# Patient Record
Sex: FEMALE | Race: WHITE | NOT HISPANIC OR LATINO | Employment: OTHER
[De-identification: names, ages, dates, MRNs, and addresses within clinical notes are randomized per-mention and may not be internally consistent; named-entity substitution may affect disease eponyms.]

---

## 2021-01-15 DIAGNOSIS — Z23 NEED FOR VACCINATION: ICD-10-CM

## 2022-02-08 ENCOUNTER — OFFICE VISIT (OUTPATIENT)
Dept: MEDICAL GROUP | Facility: CLINIC | Age: 72
End: 2022-02-08
Payer: MEDICARE

## 2022-02-08 VITALS
HEART RATE: 88 BPM | HEIGHT: 64 IN | SYSTOLIC BLOOD PRESSURE: 132 MMHG | RESPIRATION RATE: 17 BRPM | DIASTOLIC BLOOD PRESSURE: 80 MMHG | OXYGEN SATURATION: 96 % | WEIGHT: 117 LBS | BODY MASS INDEX: 19.97 KG/M2

## 2022-02-08 DIAGNOSIS — Z79.891 LONG TERM (CURRENT) USE OF OPIATE ANALGESIC: ICD-10-CM

## 2022-02-08 DIAGNOSIS — E11.42 TYPE 2 DIABETES MELLITUS WITH DIABETIC POLYNEUROPATHY, WITHOUT LONG-TERM CURRENT USE OF INSULIN (HCC): ICD-10-CM

## 2022-02-08 DIAGNOSIS — R53.83 FATIGUE, UNSPECIFIED TYPE: ICD-10-CM

## 2022-02-08 DIAGNOSIS — F32.9 MAJOR DEPRESSIVE DISORDER, REMISSION STATUS UNSPECIFIED, UNSPECIFIED WHETHER RECURRENT: ICD-10-CM

## 2022-02-08 DIAGNOSIS — F33.2 ENDOGENOUS DEPRESSION (HCC): ICD-10-CM

## 2022-02-08 DIAGNOSIS — E78.2 MIXED HYPERLIPIDEMIA: ICD-10-CM

## 2022-02-08 DIAGNOSIS — R00.2 PALPITATIONS: ICD-10-CM

## 2022-02-08 DIAGNOSIS — E11.42 DIABETIC POLYNEUROPATHY ASSOCIATED WITH TYPE 2 DIABETES MELLITUS (HCC): ICD-10-CM

## 2022-02-08 DIAGNOSIS — E03.9 ACQUIRED HYPOTHYROIDISM: ICD-10-CM

## 2022-02-08 DIAGNOSIS — Z13.1 SCREENING FOR DIABETES MELLITUS: ICD-10-CM

## 2022-02-08 DIAGNOSIS — I10 ESSENTIAL HYPERTENSION, BENIGN: ICD-10-CM

## 2022-02-08 DIAGNOSIS — Z86.718 HISTORY OF DVT (DEEP VEIN THROMBOSIS): ICD-10-CM

## 2022-02-08 DIAGNOSIS — E78.5 DYSLIPIDEMIA: ICD-10-CM

## 2022-02-08 DIAGNOSIS — R07.89 OTHER CHEST PAIN: ICD-10-CM

## 2022-02-08 DIAGNOSIS — F13.20 ANXIOLYTIC DEPENDENCE WITH CURRENT USE (HCC): ICD-10-CM

## 2022-02-08 PROBLEM — F06.4 ANXIETY DISORDER DUE TO MEDICAL CONDITION: Status: ACTIVE | Noted: 2021-10-18

## 2022-02-08 PROBLEM — R94.4 ABNORMAL RENAL FUNCTION TEST: Status: ACTIVE | Noted: 2021-10-18

## 2022-02-08 PROBLEM — E11.69 COMBINED HYPERLIPIDEMIA ASSOCIATED WITH TYPE 2 DIABETES MELLITUS (HCC): Status: ACTIVE | Noted: 2021-10-18

## 2022-02-08 PROBLEM — E11.49 TYPE 2 DIABETES MELLITUS WITH NEUROLOGIC COMPLICATION (HCC): Status: ACTIVE | Noted: 2022-02-08

## 2022-02-08 PROBLEM — M19.079 PRIMARY LOCALIZED OSTEOARTHROSIS OF ANKLE AND FOOT: Status: ACTIVE | Noted: 2019-12-18

## 2022-02-08 PROBLEM — K21.9 GASTROESOPHAGEAL REFLUX DISEASE: Status: ACTIVE | Noted: 2021-10-18

## 2022-02-08 PROBLEM — G89.29 OTHER CHRONIC PAIN: Status: ACTIVE | Noted: 2021-10-18

## 2022-02-08 PROCEDURE — 99205 OFFICE O/P NEW HI 60 MIN: CPT | Performed by: FAMILY MEDICINE

## 2022-02-08 PROCEDURE — 93000 ELECTROCARDIOGRAM COMPLETE: CPT | Performed by: FAMILY MEDICINE

## 2022-02-08 RX ORDER — LEVOTHYROXINE SODIUM 0.05 MG/1
TABLET ORAL
COMMUNITY
Start: 2021-10-18 | End: 2022-02-24 | Stop reason: SDUPTHER

## 2022-02-08 RX ORDER — LEVALBUTEROL TARTRATE 45 UG/1
AEROSOL, METERED ORAL
COMMUNITY

## 2022-02-08 RX ORDER — PRAVASTATIN SODIUM 10 MG
10 TABLET ORAL
COMMUNITY
Start: 2022-01-10 | End: 2022-08-27 | Stop reason: SDUPTHER

## 2022-02-08 RX ORDER — CLONIDINE HYDROCHLORIDE 0.1 MG/1
1 TABLET ORAL DAILY
COMMUNITY
End: 2022-07-27 | Stop reason: SDUPTHER

## 2022-02-08 RX ORDER — POTASSIUM CHLORIDE 750 MG/1
1 TABLET, EXTENDED RELEASE ORAL DAILY
COMMUNITY
End: 2023-03-29

## 2022-02-08 RX ORDER — POTASSIUM CHLORIDE 750 MG/1
TABLET, FILM COATED, EXTENDED RELEASE ORAL
COMMUNITY
End: 2023-03-29

## 2022-02-08 RX ORDER — OXYCODONE AND ACETAMINOPHEN 10; 325 MG/1; MG/1
10-325 TABLET ORAL
COMMUNITY
Start: 2022-02-02

## 2022-02-08 RX ORDER — IBUPROFEN 800 MG/1
800 TABLET ORAL EVERY 8 HOURS PRN
COMMUNITY
Start: 2021-11-30

## 2022-02-08 RX ORDER — PANTOPRAZOLE SODIUM 40 MG/1
1 TABLET, DELAYED RELEASE ORAL DAILY
COMMUNITY
End: 2024-01-16

## 2022-02-08 RX ORDER — LOSARTAN POTASSIUM 100 MG/1
TABLET ORAL
COMMUNITY
Start: 2022-01-11 | End: 2022-02-24 | Stop reason: SDUPTHER

## 2022-02-08 RX ORDER — MONTELUKAST SODIUM 10 MG/1
TABLET ORAL
COMMUNITY
Start: 2022-01-11 | End: 2022-02-24 | Stop reason: SDUPTHER

## 2022-02-08 RX ORDER — CYCLOBENZAPRINE HCL 10 MG
10 TABLET ORAL 2 TIMES DAILY PRN
COMMUNITY
Start: 2022-01-10

## 2022-02-08 RX ORDER — CLONAZEPAM 0.5 MG/1
1 TABLET ORAL 3 TIMES DAILY PRN
COMMUNITY
Start: 2021-11-30 | End: 2022-07-27 | Stop reason: SDUPTHER

## 2022-02-08 RX ORDER — BETAMETHASONE DIPROPIONATE 0.5 MG/G
1 CREAM TOPICAL
COMMUNITY
Start: 2021-10-18 | End: 2023-03-29

## 2022-02-08 RX ORDER — HYDROCHLOROTHIAZIDE 25 MG/1
25 TABLET ORAL DAILY
COMMUNITY
Start: 2021-10-18 | End: 2022-08-27 | Stop reason: SDUPTHER

## 2022-02-08 ASSESSMENT — PATIENT HEALTH QUESTIONNAIRE - PHQ9: CLINICAL INTERPRETATION OF PHQ2 SCORE: 0

## 2022-02-08 NOTE — ASSESSMENT & PLAN NOTE
Is seeing pain management at [facility]. Long term pain to ankle and due to multiple sites of osteoarthritis. Still having a lot of pain despite 62.5MME/d oxy. OD risk score of 300 with narc score 500. She has been on this regimen for many years and is feels her pain doctor is not listening to her. She is also on clonazepam, which her pain med provider is not happy with although she has been on this combination for 10+ years.

## 2022-02-08 NOTE — ASSESSMENT & PLAN NOTE
Pt reports she is diabetic. Previously on insulin but was able to come off. Not currently on medication. No recent A1C available. We will get labs. She is on a statin, and an arb. F/u when labs are done.

## 2022-02-08 NOTE — ASSESSMENT & PLAN NOTE
Had provoked DVT with four vessel involvement on R leg. Just on aspirin daily now. Was on anticoagulation with lovenox, heparin--did not tolerate well. Currently with periodic pain to top of thigh.

## 2022-02-08 NOTE — PROGRESS NOTES
CC:  Diagnoses of Dyslipidemia, Fatigue, unspecified type, Screening for diabetes mellitus, Major depressive disorder, remission status unspecified, unspecified whether recurrent, Palpitations, History of DVT (deep vein thrombosis), Other chest pain, Acquired hypothyroidism, Anxiolytic dependence with current use (HCC), Diabetic polyneuropathy associated with type 2 diabetes mellitus (HCC), Endogenous depression (HCC), Essential hypertension, benign, Long term (current) use of opiate analgesic, and Mixed hyperlipidemia were pertinent to this visit.    HISTORY OF THE PRESENT ILLNESS: Patient is a 71 y.o. female. This pleasant patient is here today to establish care. She is accompanied by her , also here to establish care, who has dementia.    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 Has retired from being a pharmacist. She reports daily uncontrolled pain from osteoarthritis but continues to work around the ranch. She is tearful during the conversation and expresses her feelings of despair. Two of her 4 kids do help her from time to time around the ranch and she has wonderful neighbors who also help, but the daily tasks are becoming too much for her to handle.    She is being treated for her long term use of opiates for pain by [facility]. There is some disagreement with the doctor about her regimen as she is on both opiates and benzodiazepine for many years thus likely has high physiologic tolerance.     Her goal is to optimize her health so that she can continue to care for the ranch and for her  and stay on the ranch as long as they possibly can.        Health Maintenance: deferred till next visit; UTD on flu shot and Covid series.       History of DVT (deep vein thrombosis)  Had provoked DVT with four vessel involvement on R leg. Just on aspirin daily now. Was on anticoagulation with lovenox, heparin--did not tolerate  well. Currently with periodic pain to top of thigh.     Acquired hypothyroidism  Check TSH    Anxiolytic dependence with current use (HCC)  On clonazepam and followed by pain management    Diabetic polyneuropathy associated with type 2 diabetes mellitus (HCC)  On low dose statin at tolerated level    Endogenous depression (HCC)  See HPI--many life stressors including keeping up a ranch, elderly  with dementia, chronic pain    Essential hypertension, benign  Controlled in office today on current medication regimen    Long term (current) use of opiate analgesic  Is seeing pain management at [facility]. Long term pain to ankle and due to multiple sites of osteoarthritis. Still having a lot of pain despite 62.5MME/d oxy. OD risk score of 300 with narc score 500. She has been on this regimen for many years and is feels her pain doctor is not listening to her. She is also on clonazepam, which her pain med provider is not happy with although she has been on this combination for 10+ years.     Mixed hyperlipidemia  She is tolerating current statin dose.     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 Previously on insulin but was able to come off. Not currently on medication. No recent A1C available. We will get labs. She is on a statin, and an arb. F/u when labs are done.     Other chest pain  Pt reports occasional left sided chest pain not related to exertion, believes that it might be more related to reflux and has a history of esophageal spasm. We discussed that a stress test would be a good idea however she is very concerned about the nuc med test as she doesn't think she will tolerate the stress medication, and she cannot do a treadmill test due to her arthritis. We decided to order a CAC score CT scan to assess for calcification of the coronary arteries; if the number is >100, we will revisit the stress test idea and refer to cardiology. She is already on a  statin and aspirin.  Noted to have LBBB on EKG in office today, not known to have this in the past.     Allergies: Ondansetron, Penicillins, Tramadol, Lyrica, and Pregabalin    Current Outpatient Medications Ordered in Epic   Medication Sig Dispense Refill   • Aug Betamethasone Dipropionate (DIPROLENE-AF) 0.05 % Cream Apply 1 Application topically.     • clonazePAM (KLONOPIN) 0.5 MG Tab      • cyclobenzaprine (FLEXERIL) 10 mg Tab Take 10 mg by mouth 2 times a day as needed.     • ibuprofen (MOTRIN) 800 MG Tab Take 800 mg by mouth every 8 hours as needed.     • levalbuterol (XOPENEX HFA) 45 MCG/ACT inhaler levalbuterol HFA 45 mcg/actuation aerosol inhaler     • levothyroxine (SYNTHROID) 50 MCG Tab levothyroxine 50 mcg tablet   TK 1 T PO QD     • montelukast (SINGULAIR) 10 MG Tab      • losartan (COZAAR) 100 MG Tab      • oxyCODONE-acetaminophen (PERCOCET-10)  MG Tab Take  Tablets by mouth.     • potassium chloride ER (KLOR-CON 10) 10 MEQ tablet Klor-Con 10 mEq tablet,extended release   TAKE 2 TABLET BY ORAL ROUTE EVERY DAY WITH FOOD     • pravastatin (PRAVACHOL) 10 MG Tab Take 10 mg by mouth every day.     • hydroCHLOROthiazide (HYDRODIURIL) 25 MG Tab Take 25 mg by mouth every day.     • cloNIDine (CATAPRES) 0.1 MG Tab Take 1 Tablet by mouth every day.     • pantoprazole (PROTONIX) 40 MG Tablet Delayed Response Take 1 Tablet by mouth every day.     • potassium chloride SA (K-DUR) 10 MEQ Tab CR Take 1 Tablet by mouth every day.       No current [facility]-ordered facility-administered medications on file.       Past Medical History:   Diagnosis Date   • Diabetes (HCC)        History reviewed. No pertinent surgical history.    Social History     Tobacco Use   • Smoking status: Never Smoker   • Smokeless tobacco: Never Used   Vaping Use   • Vaping Use: Not on file   Substance Use Topics   • Alcohol use: Not Currently   • Drug use: Not on file       Social History     Social History Narrative   • Not on file  "      History reviewed. No pertinent family history.        Exam: /80 (BP Location: Left arm, Patient Position: Sitting, BP Cuff Size: Adult)   Pulse 88   Resp 17   Ht 1.626 m (5' 4\")   Wt 53.1 kg (117 lb)   SpO2 96%  Body mass index is 20.08 kg/m².      GENERAL: No acute distress  HENT: Atraumatic, normocephalic  EYES: Extraocular movements intact, pupils equal and reactive to light  NECK: Supple, FROM  CHEST: No deformities, Equal chest expansion  RESP: Unlabored, no stridor or audible wheeze  ABD: Non-Distended  Extremities: No Clubbing, Cyanosis, or Edema  Skin: Warm/dry, without rashes  Neuro: A/O x 4, CN 2-12 Grossly intact, Motor/sensory grossly intact  Psych: Normal behavior, normal affect    EKG Interpretation    Interpreted by emergency department physician    Rhythm: normal sinus   Rate: normal  Axis: normal  Ectopy: none  Conduction: left bundle branch block (complete)  ST Segments: normal  T Waves: normal  Q Waves: none    Clinical Impression: left bundle branch block          Lab review:  labs are reviewed, up to date and normal, orders written for new lab studies as appropriate; see orders    Medical Records Reviewed:  yes    Imaging Review:  None in epic    Assessment/Plan:  1. Dyslipidemia    - Comp Metabolic Panel; Future  - HEMOGLOBIN A1C; Future    2. Fatigue, unspecified type    - CBC WITH DIFFERENTIAL; Future  - TSH WITH REFLEX TO FT4; Future  - HEMOGLOBIN A1C; Future    3. Screening for diabetes mellitus    - HEMOGLOBIN A1C; Future    4. Major depressive disorder, remission status unspecified, unspecified whether recurrent    - TSH WITH REFLEX TO FT4; Future    5. Palpitations    - EKG - Clinic Performed    6. History of DVT (deep vein thrombosis)      7. Other chest pain    - CT-CARDIAC SCORING W-SYMPTOMS; Future    8. Acquired hypothyroidism      9. Anxiolytic dependence with current use (HCC)      10. Diabetic polyneuropathy associated with type 2 diabetes mellitus (HCC)      11. " Endogenous depression (HCC)      12. Essential hypertension, benign      13. Long term (current) use of opiate analgesic      14. Mixed hyperlipidemia        No follow-ups on file.    My total time spent caring for the patient on the day of the encounter was 50 minutes.   This does not include time spent on separately billable procedures/tests.    Please note that this dictation was created using voice recognition software. I have made every reasonable attempt to correct obvious errors, but I expect that there are errors of grammar and possibly content that I did not discover before finalizing the note.

## 2022-02-08 NOTE — ASSESSMENT & PLAN NOTE
Pt reports occasional left sided chest pain not related to exertion, believes that it might be more related to reflux and has a history of esophageal spasm. We discussed that a stress test would be a good idea however she is very concerned about the nuc med test as she doesn't think she will tolerate the stress medication, and she cannot do a treadmill test due to her arthritis. We decided to order a CAC score CT scan to assess for calcification of the coronary arteries; if the number is >100, we will revisit the stress test idea and refer to cardiology. She is already on a statin and aspirin.  Noted to have LBBB on EKG in office today, not known to have this in the past.

## 2022-02-24 ENCOUNTER — TELEPHONE (OUTPATIENT)
Dept: MEDICAL GROUP | Facility: CLINIC | Age: 72
End: 2022-02-24
Payer: MEDICARE

## 2022-02-24 RX ORDER — MONTELUKAST SODIUM 10 MG/1
10 TABLET ORAL DAILY
Qty: 100 TABLET | Refills: 3 | Status: SHIPPED | OUTPATIENT
Start: 2022-02-24 | End: 2023-05-19

## 2022-02-24 RX ORDER — LEVOTHYROXINE SODIUM 0.05 MG/1
TABLET ORAL
Qty: 100 TABLET | Refills: 3 | Status: SHIPPED | OUTPATIENT
Start: 2022-02-24 | End: 2023-05-19

## 2022-02-24 RX ORDER — LOSARTAN POTASSIUM 100 MG/1
50 TABLET ORAL DAILY
Qty: 100 TABLET | Refills: 3 | Status: SHIPPED | OUTPATIENT
Start: 2022-02-24 | End: 2022-06-02 | Stop reason: SDUPTHER

## 2022-02-24 NOTE — TELEPHONE ENCOUNTER
Received request via: Pharmacy    Was the patient seen in the last year in this department? Yes    Does the patient have an active prescription (recently filled or refills available) for medication(s) requested? No     Patient requesting lorazepam and Benazepril 0.5mg bid prn #[id number]..

## 2022-03-28 ENCOUNTER — APPOINTMENT (OUTPATIENT)
Dept: MEDICAL GROUP | Facility: CLINIC | Age: 72
End: 2022-03-28
Payer: MEDICARE

## 2022-06-02 NOTE — TELEPHONE ENCOUNTER
Patient requesting change on dosage of medication to a whole tablet, rather than the 0.5mg that is prescribed. I called the pharmacy and they would not let me change it unless they had something from you saying otherwise.   Thank you!

## 2022-06-04 RX ORDER — LOSARTAN POTASSIUM 100 MG/1
100 TABLET ORAL DAILY
Qty: 100 TABLET | Refills: 3 | Status: SHIPPED | OUTPATIENT
Start: 2022-06-04 | End: 2023-08-01

## 2022-07-27 DIAGNOSIS — F06.4 ANXIETY DISORDER DUE TO MEDICAL CONDITION: ICD-10-CM

## 2022-07-27 RX ORDER — CLONIDINE HYDROCHLORIDE 0.1 MG/1
0.1 TABLET ORAL DAILY
Qty: 100 TABLET | Refills: 3 | Status: SHIPPED | OUTPATIENT
Start: 2022-07-27 | End: 2022-08-23

## 2022-07-28 RX ORDER — CLONAZEPAM 0.5 MG/1
1 TABLET ORAL 3 TIMES DAILY PRN
Qty: 90 TABLET | Refills: 1 | Status: SHIPPED | OUTPATIENT
Start: 2022-07-28 | End: 2022-10-26

## 2022-08-23 ENCOUNTER — OFFICE VISIT (OUTPATIENT)
Dept: MEDICAL GROUP | Facility: CLINIC | Age: 72
End: 2022-08-23
Payer: MEDICARE

## 2022-08-23 ENCOUNTER — PATIENT MESSAGE (OUTPATIENT)
Dept: MEDICAL GROUP | Facility: CLINIC | Age: 72
End: 2022-08-23

## 2022-08-23 VITALS
BODY MASS INDEX: 29.88 KG/M2 | OXYGEN SATURATION: 94 % | HEIGHT: 64 IN | WEIGHT: 175 LBS | SYSTOLIC BLOOD PRESSURE: 179 MMHG | HEART RATE: 86 BPM | RESPIRATION RATE: 16 BRPM | DIASTOLIC BLOOD PRESSURE: 90 MMHG

## 2022-08-23 DIAGNOSIS — E11.42 TYPE 2 DIABETES MELLITUS WITH DIABETIC POLYNEUROPATHY, WITHOUT LONG-TERM CURRENT USE OF INSULIN (HCC): ICD-10-CM

## 2022-08-23 DIAGNOSIS — Z12.31 ENCOUNTER FOR SCREENING MAMMOGRAM FOR MALIGNANT NEOPLASM OF BREAST: ICD-10-CM

## 2022-08-23 DIAGNOSIS — Z63.6 CAREGIVER STRESS: ICD-10-CM

## 2022-08-23 DIAGNOSIS — R41.3 MEMORY PROBLEM: ICD-10-CM

## 2022-08-23 DIAGNOSIS — E66.09 CLASS 1 OBESITY DUE TO EXCESS CALORIES WITH SERIOUS COMORBIDITY AND BODY MASS INDEX (BMI) OF 30.0 TO 30.9 IN ADULT: ICD-10-CM

## 2022-08-23 DIAGNOSIS — F06.4 ANXIETY DISORDER DUE TO MEDICAL CONDITION: ICD-10-CM

## 2022-08-23 DIAGNOSIS — R42 DIZZINESS: ICD-10-CM

## 2022-08-23 DIAGNOSIS — F33.2 ENDOGENOUS DEPRESSION (HCC): ICD-10-CM

## 2022-08-23 DIAGNOSIS — Z12.11 SCREEN FOR COLON CANCER: ICD-10-CM

## 2022-08-23 DIAGNOSIS — R26.9 GAIT ABNORMALITY: ICD-10-CM

## 2022-08-23 DIAGNOSIS — I44.7 LEFT BUNDLE BRANCH BLOCK: ICD-10-CM

## 2022-08-23 DIAGNOSIS — M89.20 OTHER DISORDERS OF BONE DEVELOPMENT AND GROWTH, UNSPECIFIED SITE: ICD-10-CM

## 2022-08-23 DIAGNOSIS — I10 ESSENTIAL HYPERTENSION, BENIGN: ICD-10-CM

## 2022-08-23 DIAGNOSIS — E03.9 ACQUIRED HYPOTHYROIDISM: ICD-10-CM

## 2022-08-23 DIAGNOSIS — R20.2 PARESTHESIA: ICD-10-CM

## 2022-08-23 PROBLEM — R94.4 ABNORMAL RENAL FUNCTION TEST: Status: RESOLVED | Noted: 2021-10-18 | Resolved: 2022-08-23

## 2022-08-23 PROBLEM — F32.9 REACTIVE DEPRESSION: Status: ACTIVE | Noted: 2022-08-23

## 2022-08-23 PROCEDURE — 99214 OFFICE O/P EST MOD 30 MIN: CPT | Performed by: FAMILY MEDICINE

## 2022-08-23 SDOH — SOCIAL STABILITY - SOCIAL INSECURITY: DEPENDENT RELATIVE NEEDING CARE AT HOME: Z63.6

## 2022-08-23 NOTE — ASSESSMENT & PLAN NOTE
"C/o word finding difficulty, poor recall. I suspect this is multifactorial including mood disorder, caregiver stress, chronic pain, multiple CNS agents. However she is also noting a lot of unusual sensations to her body in the last 6-12 months, including \"hug\" sensations to chest, blurred vision, difficulty with gait, brain fog, no falls but is needing to hang on to things due to balance issues. She has a friend with MS and also went online, and found that she matched the symptoms very well and had always wondered what these symptoms were from. She is on multiple CNS drugs so these may be contributing.    Given all of these changes we will go ahead and get MRI brain with and without to assess for MS changes.   "

## 2022-08-23 NOTE — ASSESSMENT & PLAN NOTE
A1C is 8.2, up slightly, not well controlled, would like to get it below 8. She had made a decision with her prior doctor in the past to avoid antihyperglycemics but we will need to revisit that. Ongoing discussion with patient.

## 2022-08-23 NOTE — PROGRESS NOTES
"CC:Diagnoses of Paresthesia, Gait abnormality, Dizziness, Anxiety disorder due to medical condition, Endogenous depression (HCC), Encounter for screening mammogram for malignant neoplasm of breast, Screen for colon cancer, Other disorders of bone development and growth, unspecified site, Type 2 diabetes mellitus with diabetic polyneuropathy, without long-term current use of insulin (HCC), Class 1 obesity due to excess calories with serious comorbidity and body mass index (BMI) of 30.0 to 30.9 in adult, Acquired hypothyroidism, Caregiver stress, Memory problem, and Essential hypertension, benign were pertinent to this visit.      HISTORY OF PRESENT ILLNESS: Patient is a 72 y.o. female established patient who presents today to discuss caregiver stress and to review labs done yesterday.         Acquired hypothyroidism  TSH in good range on current dosing.    Caregiver stress  Enormous stress at home-- with dementia and recent stroke--and she is dedicated to the idea that they will \"both die at the ranch\". 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 He had a stroke not long ago and although he recovered fairly well she notes that he is becoming increasingly weak and falling more. She has elected that if he should have more stroke symptoms she would not take him to the hospital and would let nature takes its course. She has a daughter who is a nurse who is not altogether supportive of this idea. We will have an upcoming appointment with him to discuss POLST/goals of care.     She does not want to ask her adult children for help because she doesn't want to disrupt their busy lives. We discussed that what she is doing is not sustainable and she needs to ask for help.  will not accept any outside help within the home so pt has not had any respite. She cries a lot, and in the office is teary and distraught. She has " "been on SSRIs in the past for depression, but ultimately found that they made her feel worse and even suicidal, thus declines further SSRIs. She would like referral to psychotherapy however so this referral was provided as she would benefit greatly from having someone to talk to therapeutically.     Memory problem  C/o word finding difficulty, poor recall. I suspect this is multifactorial including mood disorder, caregiver stress, chronic pain, multiple CNS agents. However she is also noting a lot of unusual sensations to her body in the last 6-12 months, including \"hug\" sensations to chest, blurred vision, difficulty with gait, brain fog, no falls but is needing to hang on to things due to balance issues. She has a friend with MS and also went online, and found that she matched the symptoms very well and had always wondered what these symptoms were from. She is on multiple CNS drugs so these may be contributing.    Given all of these changes we will go ahead and get MRI brain with and without to assess for MS changes.     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 She had made a decision with her prior doctor in the past to avoid antihyperglycemics but we will need to revisit that. Ongoing discussion with patient.     Essential hypertension, benign  Not well controlled. On med review, pt has not been taking HCTZ due to annoyance of having to urinate more frequently. She will restart this.     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) 02/08/2022    Acquired hypothyroidism 10/18/2021    Anxiety disorder due to medical " condition 10/18/2021    Anxiolytic dependence with current use (HCC) 10/18/2021    Combined hyperlipidemia associated with type 2 diabetes mellitus (HCC) 10/18/2021    Diabetic polyneuropathy associated with type 2 diabetes mellitus (HCC) 10/18/2021    Endogenous depression (HCC) 10/18/2021    Essential hypertension, benign 10/18/2021    Gastroesophageal reflux disease 10/18/2021    Long term (current) use of opiate analgesic 10/18/2021    Mixed hyperlipidemia 10/18/2021    Other chronic pain 10/18/2021    Primary localized osteoarthrosis of ankle and foot 12/18/2019        Allergies:Ondansetron, Penicillins, Tramadol, Lyrica, and Pregabalin    Current Outpatient Medications   Medication Sig Dispense Refill    clonazePAM (KLONOPIN) 0.5 MG Tab Take 2 Tablets by mouth 3 times a day as needed (Anxiety disorder due to medical condition) for up to 90 days. 90 Tablet 1    losartan (COZAAR) 100 MG Tab Take 1 Tablet by mouth every day. 100 Tablet 3    levothyroxine (SYNTHROID) 50 MCG Tab levothyroxine 50 mcg tablet  TK 1 T PO  Tablet 3    montelukast (SINGULAIR) 10 MG Tab Take 1 Tablet by mouth every day. 100 Tablet 3    Aug Betamethasone Dipropionate (DIPROLENE-AF) 0.05 % Cream Apply 1 Application topically.      cyclobenzaprine (FLEXERIL) 10 mg Tab Take 10 mg by mouth 2 times a day as needed.      ibuprofen (MOTRIN) 800 MG Tab Take 800 mg by mouth every 8 hours as needed.      levalbuterol (XOPENEX HFA) 45 MCG/ACT inhaler levalbuterol HFA 45 mcg/actuation aerosol inhaler      oxyCODONE-acetaminophen (PERCOCET-10)  MG Tab Take  Tablets by mouth.      potassium chloride ER (KLOR-CON) 10 MEQ tablet Klor-Con 10 mEq tablet,extended release   TAKE 2 TABLET BY ORAL ROUTE EVERY DAY WITH FOOD      pravastatin (PRAVACHOL) 10 MG Tab Take 10 mg by mouth every day.      hydroCHLOROthiazide (HYDRODIURIL) 25 MG Tab Take 25 mg by mouth every day.      pantoprazole (PROTONIX) 40 MG Tablet Delayed Response Take 1 Tablet  "by mouth every day.      potassium chloride SA (K-DUR) 10 MEQ Tab CR Take 1 Tablet by mouth every day.       No current facility-administered medications for this visit.       Social History     Tobacco Use    Smoking status: Never    Smokeless tobacco: Never   Substance Use Topics    Alcohol use: Not Currently     Social History     Social History Narrative    Not on file       History reviewed. No pertinent family history.    Exam:    BP (!) 179/90 (BP Location: Right arm, Patient Position: Sitting, BP Cuff Size: Adult)   Pulse 86   Resp 16   Ht 1.626 m (5' 4\")   Wt 79.4 kg (175 lb)   SpO2 94%  Body mass index is 30.04 kg/m².    General:  Well nourished, well developed female in moderate emotional distress.  HENT: Atraumatic, normocephalic  EYES: Extraocular movements intact, pupils equal and reactive to light  NECK: Supple, FROM  CHEST: No deformities, Equal chest expansion  RESP: Unlabored, no stridor or audible wheeze  ABD: Non-Distended  Extremities: No Clubbing, Cyanosis, or Edema  Skin: Warm/dry, without rashes  Neuro: A/O x 4, CN 2-12 Grossly intact, Motor/sensory grossly intact  Psych: Mood and affect congruent      LABS: Results reviewed and discussed with the patient, questions answered.        Return in about 3 months (around 11/23/2022).    My total time spent caring for the patient on the day of the encounter was 45 minutes.   This does not include time spent on separately billable procedures/tests.           "

## 2022-08-23 NOTE — ASSESSMENT & PLAN NOTE
"Enormous stress at home-- with dementia and recent stroke--and she is dedicated to the idea that they will \"both die at the ranch\". 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 He had a stroke not long ago and although he recovered fairly well she notes that he is becoming increasingly weak and falling more. She has elected that if he should have more stroke symptoms she would not take him to the hospital and would let nature takes its course. She has a daughter who is a nurse who is not altogether supportive of this idea. We will have an upcoming appointment with him to discuss POLST/goals of care.     She does not want to ask her adult children for help because she doesn't want to disrupt their busy lives. We discussed that what she is doing is not sustainable and she needs to ask for help.  will not accept any outside help within the home so pt has not had any respite. She cries a lot, and in the office is teary and distraught. She has been on SSRIs in the past for depression, but ultimately found that they made her feel worse and even suicidal, thus declines further SSRIs. She would like referral to psychotherapy however so this referral was provided as she would benefit greatly from having someone to talk to therapeutically.   "

## 2022-08-23 NOTE — ASSESSMENT & PLAN NOTE
Not well controlled. On med review, pt has not been taking HCTZ due to annoyance of having to urinate more frequently. She will restart this.

## 2022-08-26 ENCOUNTER — PATIENT MESSAGE (OUTPATIENT)
Dept: MEDICAL GROUP | Facility: CLINIC | Age: 72
End: 2022-08-26
Payer: MEDICARE

## 2022-08-26 DIAGNOSIS — E78.2 COMBINED HYPERLIPIDEMIA ASSOCIATED WITH TYPE 2 DIABETES MELLITUS (HCC): ICD-10-CM

## 2022-08-26 DIAGNOSIS — E11.69 COMBINED HYPERLIPIDEMIA ASSOCIATED WITH TYPE 2 DIABETES MELLITUS (HCC): ICD-10-CM

## 2022-08-26 DIAGNOSIS — E11.42 TYPE 2 DIABETES MELLITUS WITH DIABETIC POLYNEUROPATHY, WITHOUT LONG-TERM CURRENT USE OF INSULIN (HCC): ICD-10-CM

## 2022-08-26 DIAGNOSIS — I10 ESSENTIAL HYPERTENSION, BENIGN: ICD-10-CM

## 2022-08-27 RX ORDER — BLOOD-GLUCOSE METER
1 EACH MISCELLANEOUS ONCE
Qty: 1 KIT | Refills: 1 | Status: SHIPPED | OUTPATIENT
Start: 2022-08-27 | End: 2022-08-27

## 2022-08-27 RX ORDER — HYDROCHLOROTHIAZIDE 25 MG/1
25 TABLET ORAL DAILY
Qty: 100 TABLET | Refills: 3 | Status: SHIPPED | OUTPATIENT
Start: 2022-08-27 | End: 2023-03-29

## 2022-08-27 RX ORDER — PRAVASTATIN SODIUM 10 MG
10 TABLET ORAL
Qty: 100 TABLET | Refills: 3 | Status: SHIPPED | OUTPATIENT
Start: 2022-08-27 | End: 2023-05-31

## 2022-08-29 DIAGNOSIS — E11.42 TYPE 2 DIABETES MELLITUS WITH DIABETIC POLYNEUROPATHY, WITHOUT LONG-TERM CURRENT USE OF INSULIN (HCC): ICD-10-CM

## 2022-11-09 ENCOUNTER — PATIENT MESSAGE (OUTPATIENT)
Dept: HEALTH INFORMATION MANAGEMENT | Facility: OTHER | Age: 72
End: 2022-11-09

## 2022-12-01 ENCOUNTER — OFFICE VISIT (OUTPATIENT)
Dept: MEDICAL GROUP | Facility: CLINIC | Age: 72
End: 2022-12-01
Payer: MEDICARE

## 2022-12-01 VITALS
RESPIRATION RATE: 16 BRPM | DIASTOLIC BLOOD PRESSURE: 84 MMHG | WEIGHT: 178 LBS | OXYGEN SATURATION: 94 % | BODY MASS INDEX: 30.39 KG/M2 | HEART RATE: 86 BPM | HEIGHT: 64 IN | SYSTOLIC BLOOD PRESSURE: 132 MMHG

## 2022-12-01 DIAGNOSIS — F06.4 ANXIETY DISORDER DUE TO MEDICAL CONDITION: ICD-10-CM

## 2022-12-01 DIAGNOSIS — Z63.6 CAREGIVER STRESS: ICD-10-CM

## 2022-12-01 DIAGNOSIS — F13.20 ANXIOLYTIC DEPENDENCE WITH CURRENT USE (HCC): ICD-10-CM

## 2022-12-01 DIAGNOSIS — Z12.31 ENCOUNTER FOR SCREENING MAMMOGRAM FOR BREAST CANCER: ICD-10-CM

## 2022-12-01 DIAGNOSIS — E11.42 TYPE 2 DIABETES MELLITUS WITH DIABETIC POLYNEUROPATHY, WITHOUT LONG-TERM CURRENT USE OF INSULIN (HCC): ICD-10-CM

## 2022-12-01 LAB
HBA1C MFR BLD: 6.7 % (ref 0–5.6)
INT CON NEG: ABNORMAL
INT CON POS: ABNORMAL

## 2022-12-01 PROCEDURE — 83036 HEMOGLOBIN GLYCOSYLATED A1C: CPT | Performed by: FAMILY MEDICINE

## 2022-12-01 PROCEDURE — 99214 OFFICE O/P EST MOD 30 MIN: CPT | Performed by: FAMILY MEDICINE

## 2022-12-01 RX ORDER — CLONAZEPAM 1 MG/1
1 TABLET ORAL 3 TIMES DAILY PRN
Qty: 90 TABLET | Refills: 1 | Status: SHIPPED | OUTPATIENT
Start: 2022-12-01 | End: 2023-03-24

## 2022-12-01 RX ORDER — CLONAZEPAM 1 MG/1
1 TABLET ORAL 3 TIMES DAILY PRN
COMMUNITY
Start: 2022-10-16 | End: 2022-12-01 | Stop reason: SDUPTHER

## 2022-12-01 SDOH — SOCIAL STABILITY - SOCIAL INSECURITY: DEPENDENT RELATIVE NEEDING CARE AT HOME: Z63.6

## 2022-12-01 ASSESSMENT — PATIENT HEALTH QUESTIONNAIRE - PHQ9
SUM OF ALL RESPONSES TO PHQ9 QUESTIONS 1 AND 2: 3
1. LITTLE INTEREST OR PLEASURE IN DOING THINGS: NOT AT ALL
3. TROUBLE FALLING OR STAYING ASLEEP OR SLEEPING TOO MUCH: NEARLY EVERY DAY
9. THOUGHTS THAT YOU WOULD BE BETTER OFF DEAD, OR OF HURTING YOURSELF: NOT AT ALL
4. FEELING TIRED OR HAVING LITTLE ENERGY: NEARLY EVERY DAY
5. POOR APPETITE OR OVEREATING: NOT AT ALL
7. TROUBLE CONCENTRATING ON THINGS, SUCH AS READING THE NEWSPAPER OR WATCHING TELEVISION: SEVERAL DAYS
SUM OF ALL RESPONSES TO PHQ QUESTIONS 1-9: 10
6. FEELING BAD ABOUT YOURSELF - OR THAT YOU ARE A FAILURE OR HAVE LET YOURSELF OR YOUR FAMILY DOWN: NOT AL ALL
2. FEELING DOWN, DEPRESSED, IRRITABLE, OR HOPELESS: NEARLY EVERY DAY
8. MOVING OR SPEAKING SO SLOWLY THAT OTHER PEOPLE COULD HAVE NOTICED. OR THE OPPOSITE, BEING SO FIGETY OR RESTLESS THAT YOU HAVE BEEN MOVING AROUND A LOT MORE THAN USUAL: NOT AT ALL

## 2022-12-01 ASSESSMENT — FIBROSIS 4 INDEX: FIB4 SCORE: 1.21

## 2022-12-01 NOTE — ASSESSMENT & PLAN NOTE
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 She has learned to let go of some of the household chores and focus on the time she has remaining with Reg. Reg is in the advanced stages of dementia and is sleeping a lot more, appears to be declining. She is prepared for his death. She is using the clonazepam sparingly and does not take all three tablets every day.

## 2022-12-01 NOTE — PROGRESS NOTES
CC:Diagnoses of Encounter for screening mammogram for breast cancer, Type 2 diabetes mellitus with diabetic polyneuropathy, without long-term current use of insulin (HCC), Anxiety disorder due to medical condition, Anxiolytic dependence with current use ([facility]), and Caregiver stress were pertinent to this visit.      HISTORY OF PRESENT ILLNESS: Patient is a 72 y.o. female established patient who presents today to discuss the following:        Type 2 diabetes mellitus with neurologic complication ([facility])  Eventually able to tolerate victoza and has lost 14 pounds which is great. Her A1C has dropped to 6.7, much better control. She feels better and is happy with this medication.   Unable to provide urine sample for microalbumin--she has the lab slip at home and will get done when able. Difficult due to caregiving duties.       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 She has learned to let go of some of the household chores and focus on the time she has remaining with Reg. Reg is in the advanced stages of dementia and is sleeping a lot more, appears to be declining. She is prepared for his death. She is using the clonazepam sparingly and does not take all three tablets every day.     Anxiolytic dependence with current use ([facility])  Pt brought in pill bottle and has used appropriately with many pills left. She does not take all 3 every day. She is well aware of the strong recommendation to not use BZDs while on chronic opiate therapy. She has not attempted to fill early and  is appropriate. She has narcan at home. She receives her opiate therapy through [facility] neurosurgery and their notes were reviewed by me in detail.     Patient Active Problem List    Diagnosis Date Noted    Obesity due to excess calories with serious comorbidity 08/23/2022    Caregiver stress 08/23/2022    Memory problem 08/23/2022    History of DVT (deep vein thrombosis) 02/08/2022    Palpitations 02/08/2022    Other  chest pain 02/08/2022    Type 2 diabetes mellitus with neurologic complication ([facility]) 02/08/2022    Acquired hypothyroidism 10/18/2021    Anxiety disorder due to medical condition 10/18/2021    Anxiolytic dependence with current use ([facility]) 10/18/2021    Combined hyperlipidemia associated with type 2 diabetes mellitus ([facility]) 10/18/2021    Diabetic polyneuropathy associated with type 2 diabetes mellitus ([facility]) 10/18/2021    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 90 Tablet 1    Insulin Pen Needle 32G X 6 MM Misc 1 Each every day. 100 Each 3    hydroCHLOROthiazide (HYDRODIURIL) 25 MG Tab Take 1 Tablet by mouth every day. 100 Tablet 3    pravastatin (PRAVACHOL) 10 MG Tab Take 1 Tablet by mouth every day. 100 Tablet 3    glucose blood (CONTOUR NEXT TEST) strip 1 Strip by Other route in the morning, at noon, and at bedtime. 300 Strip 3    Lancets 30G Misc 1 Each in the morning, at noon, and at bedtime. 100 Each 11    liraglutide (VICTOZA) 18 MG/3ML Solution Pen-injector Inject 0.6 mg under the skin every day. After seven days, increase dose to 1.2mg under the skin every day. 6 mL 3    losartan (COZAAR) 100 MG Tab Take 1 Tablet by mouth every day. 100 Tablet 3    levothyroxine (SYNTHROID) 50 MCG Tab levothyroxine 50 mcg tablet  TK 1 T PO  Tablet 3    montelukast (SINGULAIR) 10 MG Tab Take 1 Tablet by mouth every day. 100 Tablet 3    Aug Betamethasone Dipropionate (DIPROLENE-AF) 0.05 %  "Cream Apply 1 Application topically.      cyclobenzaprine (FLEXERIL) 10 mg Tab Take 10 mg by mouth 2 times a day as needed.      ibuprofen (MOTRIN) 800 MG Tab Take 800 mg by mouth every 8 hours as needed.      levalbuterol (XOPENEX HFA) 45 MCG/ACT inhaler levalbuterol HFA 45 mcg/actuation aerosol inhaler      oxyCODONE-acetaminophen (PERCOCET-10)  MG Tab Take  Tablets by mouth.      potassium chloride ER (KLOR-CON) 10 MEQ tablet Klor-Con 10 mEq tablet,extended release   TAKE 2 TABLET BY ORAL ROUTE EVERY DAY WITH FOOD      pantoprazole (PROTONIX) 40 MG Tablet Delayed Response Take 1 Tablet by mouth every day.      potassium chloride SA (K-DUR) 10 MEQ Tab CR Take 1 Tablet by mouth every day.       No current facility-administered medications for this visit.       Social History     Tobacco Use    Smoking status: Never    Smokeless tobacco: Never   Substance Use Topics    Alcohol use: Not Currently     Social History     Social History Narrative    Not on file       No family history on file.    Exam:    /84 (BP Location: Left arm, Patient Position: Sitting, BP Cuff Size: Adult)   Pulse 86   Resp 16   Ht 1.626 m (5' 4\")   Wt 80.7 kg (178 lb)   SpO2 94%  Body mass index is 30.55 kg/m².    General:  Well nourished, well developed female in NAD  HENT: Atraumatic, normocephalic  EYES: Extraocular movements intact, pupils equal and reactive to light  NECK: Supple, FROM  CHEST: No deformities, Equal chest expansion  RESP: Unlabored, no stridor or audible wheeze  ABD: Non-Distended  Extremities: No Clubbing, Cyanosis, or Edema  Skin: Warm/dry, without rashes  Neuro: A/O x 4, CN 2-12 Grossly intact, Motor/sensory grossly intact  Psych: Normal behavior, normal affect      LABS:: Results reviewed and discussed with the patient, questions answered.        Return in about 3 months (around 3/1/2023).    My total time spent caring for the patient on the day of the encounter was 30 minutes.   This does not " include time spent on separately billable procedures/tests.

## 2022-12-01 NOTE — ASSESSMENT & PLAN NOTE
Eventually able to tolerate victoza and has lost 14 pounds which is great. Her A1C has dropped to 6.7, much better control. She feels better and is happy with this medication.   Unable to provide urine sample for microalbumin--she has the lab slip at home and will get done when able. Difficult due to caregiving duties.

## 2022-12-01 NOTE — ASSESSMENT & PLAN NOTE
Pt brought in pill bottle and has used appropriately with many pills left. She does not take all 3 every day. She is well aware of the strong recommendation to not use BZDs while on chronic opiate therapy. She has not attempted to fill early and  is appropriate. She has narcan at home. She receives her opiate therapy through [facility] neurosurgery and their notes were reviewed by me in detail.

## 2022-12-05 ENCOUNTER — OFFICE VISIT (OUTPATIENT)
Dept: MEDICAL GROUP | Facility: CLINIC | Age: 72
End: 2022-12-05
Payer: MEDICARE

## 2022-12-05 DIAGNOSIS — F06.4 ANXIETY DISORDER DUE TO MEDICAL CONDITION: ICD-10-CM

## 2022-12-05 PROCEDURE — 90834 PSYTX W PT 45 MINUTES: CPT | Performed by: PSYCHOLOGIST

## 2022-12-05 NOTE — PROGRESS NOTES
[facility]  Psychotherapy Summary Note    Full therapy note has been documented and is under restricted viewing.  Please see below for summary of today's session.     Patient Name: Felicia Schaefer  Patient MRN: [id number]  Today's Date:  12/5/22    Type of session: intake assessment  Session start time: 11  Session stop time: 11:45  Length of time spent face to face with patient: 45  Persons in attendance: patient    Diagnoses:   1. Anxiety disorder due to medical condition       Discusses longstanding emotional strain due to a difficult marriage, intensifying due to 's increased physical demands and her own physical concerns.    Has been in therapy 15 years, therapist is retiring so pt. Is interested in support.    Discusses her 86 year old 's dementia, and her own struggle with MS (being worked up).  Retired clinical pharmacist, used to being very busy and capable.  Struggling with seemingly insurmountable problems of being a caregiver.    No SI. No KELI.    [place], loves it and it is a lot of work.    Declined referrals to other therapist, prefers to schedule with this writer occasionally for support and problem solving.  Oanh Carlisle, Ph.D.

## 2022-12-11 ENCOUNTER — PATIENT MESSAGE (OUTPATIENT)
Dept: MEDICAL GROUP | Facility: CLINIC | Age: 72
End: 2022-12-11
Payer: MEDICARE

## 2023-01-10 DIAGNOSIS — E66.09 CLASS 1 OBESITY DUE TO EXCESS CALORIES WITH SERIOUS COMORBIDITY AND BODY MASS INDEX (BMI) OF 30.0 TO 30.9 IN ADULT: ICD-10-CM

## 2023-01-10 DIAGNOSIS — I44.7 LEFT BUNDLE BRANCH BLOCK: ICD-10-CM

## 2023-01-10 DIAGNOSIS — E11.42 TYPE 2 DIABETES MELLITUS WITH DIABETIC POLYNEUROPATHY, WITHOUT LONG-TERM CURRENT USE OF INSULIN (HCC): ICD-10-CM

## 2023-01-10 RX ORDER — LIRAGLUTIDE 6 MG/ML
INJECTION SUBCUTANEOUS
Qty: 6 ML | Refills: 3 | Status: SHIPPED | OUTPATIENT
Start: 2023-01-10 | End: 2023-05-31

## 2023-01-10 NOTE — TELEPHONE ENCOUNTER
Received request via: Pharmacy    Was the patient seen in the last year in this department? Yes    Does the patient have an active prescription (recently filled or refills available) for medication(s) requested? No    Does the patient have USP Plus and need 100 day supply (blood pressure, diabetes and cholesterol meds only)? Patient does not have SCP

## 2023-03-28 ENCOUNTER — OFFICE VISIT (OUTPATIENT)
Dept: MEDICAL GROUP | Facility: CLINIC | Age: 73
End: 2023-03-28
Payer: MEDICARE

## 2023-03-28 DIAGNOSIS — F06.4 ANXIETY DISORDER DUE TO MEDICAL CONDITION: ICD-10-CM

## 2023-03-28 PROCEDURE — 90834 PSYTX W PT 45 MINUTES: CPT | Performed by: PSYCHOLOGIST

## 2023-03-28 NOTE — PROGRESS NOTES
[facility]  Psychotherapy Summary Note    Full therapy note has been documented and is under restricted viewing.  Please see below for summary of today's session.     Patient Name: Felicia Schaefer  Patient MRN: [id number]  Today's Date:  3/28/23    Type of session: individual psychotherapy  Session start time: 3  Session stop time: 3:45  Length of time spent face to face with patient: 45  Persons in attendance: patient    Diagnoses:   1. Anxiety disorder due to medical condition         Symptoms currently being addressed in therapy: anxiety    Therapeutic Intervention(s): CBT. Support, problem solving    Treatment Goal(s)/Objective(s) addressed: Asking for help, being clear with her needs    Progress toward Treatment Goals: No change    Plan:      - Continue monthly therapy.  Recommended support services for caregivers, alzheimer's association and .    Oanh Carlisle, Ph.D.

## 2023-03-28 NOTE — PSYCHOTHERAPY
Discusses fatigue and overwhelm at managing her own condition and caring for her  and boundaries with her son.    Coping effectively, asked for support resources, advised [facility] and [employer].    Return in one month.

## 2023-03-29 ENCOUNTER — OFFICE VISIT (OUTPATIENT)
Dept: MEDICAL GROUP | Facility: CLINIC | Age: 73
End: 2023-03-29
Payer: MEDICARE

## 2023-03-29 VITALS
TEMPERATURE: 98 F | HEART RATE: 85 BPM | WEIGHT: 165 LBS | SYSTOLIC BLOOD PRESSURE: 138 MMHG | DIASTOLIC BLOOD PRESSURE: 84 MMHG | HEIGHT: 65 IN | RESPIRATION RATE: 18 BRPM | OXYGEN SATURATION: 96 % | BODY MASS INDEX: 27.49 KG/M2

## 2023-03-29 DIAGNOSIS — R29.6 FREQUENT FALLS: ICD-10-CM

## 2023-03-29 DIAGNOSIS — Z91.81 RISK FOR FALLS: ICD-10-CM

## 2023-03-29 DIAGNOSIS — F13.20 ANXIOLYTIC DEPENDENCE WITH CURRENT USE (HCC): ICD-10-CM

## 2023-03-29 DIAGNOSIS — E11.42 TYPE 2 DIABETES MELLITUS WITH DIABETIC POLYNEUROPATHY, WITHOUT LONG-TERM CURRENT USE OF INSULIN (HCC): ICD-10-CM

## 2023-03-29 DIAGNOSIS — I10 ESSENTIAL HYPERTENSION, BENIGN: ICD-10-CM

## 2023-03-29 LAB
HBA1C MFR BLD: 6.2 % (ref ?–5.8)
POCT INT CON NEG: NEGATIVE
POCT INT CON POS: POSITIVE

## 2023-03-29 PROCEDURE — 99214 OFFICE O/P EST MOD 30 MIN: CPT | Performed by: FAMILY MEDICINE

## 2023-03-29 PROCEDURE — 83036 HEMOGLOBIN GLYCOSYLATED A1C: CPT | Performed by: FAMILY MEDICINE

## 2023-03-29 RX ORDER — CLONAZEPAM 1 MG/1
1 TABLET ORAL 3 TIMES DAILY PRN
Qty: 90 TABLET | Refills: 5 | Status: SHIPPED | OUTPATIENT
Start: 2023-03-29 | End: 2023-06-27

## 2023-03-29 RX ORDER — NIRMATRELVIR AND RITONAVIR 300-100 MG
KIT ORAL
COMMUNITY
Start: 2023-01-09 | End: 2023-03-29

## 2023-03-29 RX ORDER — PREDNISONE 20 MG/1
TABLET ORAL
COMMUNITY
Start: 2023-01-09 | End: 2023-03-29

## 2023-03-29 RX ORDER — BETAMETHASONE DIPROPIONATE 0.5 MG/G
1 CREAM TOPICAL
COMMUNITY
Start: 2023-03-22

## 2023-03-29 RX ORDER — ASPIRIN 81 MG/1
81 TABLET, CHEWABLE ORAL DAILY
COMMUNITY

## 2023-03-29 ASSESSMENT — FIBROSIS 4 INDEX: FIB4 SCORE: 1.21

## 2023-03-29 ASSESSMENT — PATIENT HEALTH QUESTIONNAIRE - PHQ9
5. POOR APPETITE OR OVEREATING: 0 - NOT AT ALL
CLINICAL INTERPRETATION OF PHQ2 SCORE: 2
SUM OF ALL RESPONSES TO PHQ QUESTIONS 1-9: 8

## 2023-03-29 NOTE — ASSESSMENT & PLAN NOTE
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 Had trouble getting up due to ankle pain and neuropathy.

## 2023-03-29 NOTE — PROGRESS NOTES
CC:Diagnoses of Type 2 diabetes mellitus with diabetic polyneuropathy, without long-term current use of insulin (HCC), Risk for falls, Anxiolytic dependence with current use (HCC), Class 1 obesity due to excess calories with serious comorbidity and body mass index (BMI) of 30.0 to 30.9 in adult, and Essential hypertension, benign were pertinent to this visit.      HISTORY OF PRESENT ILLNESS: Patient is a 72 y.o. female established patient who presents today to review multiple chronic problems.         Type 2 diabetes mellitus with neurologic complication (HCC)  a1c 6.2 woot!! Also 22 lb weight loss. victoza working well. Doing very well.     Obesity due to excess calories with serious comorbidity  Has lost 22 lbs with victoza for her dm2, feels better. BMI down to 27.     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 Had trouble getting up due to ankle pain and neuropathy. Will refer to PT for fall prevention therapy.     Essential hypertension, benign  Has been off the hctz for quite some time, due to issues with urinary incontinence. BP ok in office, ok to discontinue.     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) 02/08/2022    Acquired hypothyroidism 10/18/2021    Anxiety disorder due to medical condition 10/18/2021    Anxiolytic dependence with current use (HCC) 10/18/2021    Combined hyperlipidemia associated with type 2 diabetes mellitus (HCC) 10/18/2021    Diabetic polyneuropathy associated with type 2 diabetes mellitus (HCC) 10/18/2021    Endogenous depression (HCC) 10/18/2021    Essential hypertension, benign 10/18/2021    Gastroesophageal reflux disease  10/18/2021    Long term (current) use of opiate analgesic 10/18/2021    Mixed hyperlipidemia 10/18/2021    Other chronic pain 10/18/2021    Primary localized osteoarthrosis of ankle and foot 12/18/2019        Allergies:Ondansetron, Penicillins, Tramadol, Lyrica, Pregabalin, Rivaroxaban, and Warfarin    Current Outpatient Medications   Medication Sig Dispense Refill    Aug Betamethasone Dipropionate (DIPROLENE-AF) 0.05 % Cream Apply 1 Application. topically.      clonazePAM (KLONOPIN) 1 MG Tab Take 1 Tablet by mouth 3 times a day as needed (severe anxiety) for up to 90 days. 90 Tablet 5    aspirin (ASA) 81 MG Chew Tab chewable tablet Chew 81 mg every day.      VICTOZA 18 MG/3ML Solution Pen-injector INJECT 0.6 MG UNDER THE SKIN EVERY DAY. AFTER 7 DAYS, INCREASE DOSE TO 1.2 MG. 6 mL 3    Insulin Pen Needle 32G X 6 MM Misc 1 Each every day. 100 Each 3    pravastatin (PRAVACHOL) 10 MG Tab Take 1 Tablet by mouth every day. 100 Tablet 3    glucose blood (CONTOUR NEXT TEST) strip 1 Strip by Other route in the morning, at noon, and at bedtime. 300 Strip 3    Lancets 30G Misc 1 Each in the morning, at noon, and at bedtime. 100 Each 11    losartan (COZAAR) 100 MG Tab Take 1 Tablet by mouth every day. 100 Tablet 3    levothyroxine (SYNTHROID) 50 MCG Tab levothyroxine 50 mcg tablet  TK 1 T PO  Tablet 3    montelukast (SINGULAIR) 10 MG Tab Take 1 Tablet by mouth every day. 100 Tablet 3    cyclobenzaprine (FLEXERIL) 10 mg Tab Take 10 mg by mouth 2 times a day as needed.      ibuprofen (MOTRIN) 800 MG Tab Take 800 mg by mouth every 8 hours as needed.      levalbuterol (XOPENEX HFA) 45 MCG/ACT inhaler levalbuterol HFA 45 mcg/actuation aerosol inhaler      oxyCODONE-acetaminophen (PERCOCET-10)  MG Tab Take  Tablets by mouth.      pantoprazole (PROTONIX) 40 MG Tablet Delayed Response Take 1 Tablet by mouth every day.       No current facility-administered medications for this visit.       Social History     Tobacco  "Use    Smoking status: Never    Smokeless tobacco: Never   Vaping Use    Vaping Use: Former   Substance Use Topics    Alcohol use: Not Currently    Drug use: Not Currently     Social History     Social History Narrative    Not on file       History reviewed. No pertinent family history.    Exam:    /84 (BP Location: Left arm, Patient Position: Sitting, BP Cuff Size: Adult)   Pulse 85   Temp 36.7 °C (98 °F) (Temporal)   Resp 18   Ht 1.638 m (5' 4.5\")   Wt 74.8 kg (165 lb)   SpO2 96%  Body mass index is 27.88 kg/m².    General:  Well nourished, well developed female in NAD  HENT: Atraumatic, normocephalic  EYES: Extraocular movements intact, pupils equal and reactive to light  NECK: Supple, FROM  CHEST: No deformities, Equal chest expansion  RESP: Unlabored, no stridor or audible wheeze  ABD: Non-Distended  Extremities: No Clubbing, Cyanosis, or Edema  Skin: Warm/dry, without rashes  Neuro: A/O x 4, CN 2-12 Grossly intact, Motor/sensory grossly intact  Psych: Normal behavior, normal affect      LABS: Results reviewed and discussed with the patient, questions answered.        Return in about 6 months (around 9/29/2023).    My total time spent caring for the patient on the day of the encounter was 30 minutes.   This does not include time spent on separately billable procedures/tests.     "

## 2023-03-29 NOTE — ASSESSMENT & PLAN NOTE
Has been off the hctz for quite some time, due to issues with urinary incontinence. BP ok in office, ok to discontinue.

## 2023-05-02 ENCOUNTER — OFFICE VISIT (OUTPATIENT)
Dept: MEDICAL GROUP | Facility: CLINIC | Age: 73
End: 2023-05-02
Payer: MEDICARE

## 2023-05-02 DIAGNOSIS — F06.4 ANXIETY DISORDER DUE TO MEDICAL CONDITION: ICD-10-CM

## 2023-05-02 PROCEDURE — 90834 PSYTX W PT 45 MINUTES: CPT | Performed by: PSYCHOLOGIST

## 2023-05-02 NOTE — PSYCHOTHERAPY
Struggling with stress of managing ailing , and many demands due to the ranch.  [place]plet[place], keeping [place] home. Not sleeping well, recommended CBTI    Stable, tearful.    Return in one month.

## 2023-05-02 NOTE — PROGRESS NOTES
Full therapy note has been documented and is under restricted viewing.  Please see below for summary of today's session.      Patient Name: Felicia Schaefer  Patient MRN: [id number]  Today's Date:  5/2/23     Type of session: individual psychotherapy  Session start time: 3  Session stop time: 3:45  Length of time spent face to face with patient: 45  Persons in attendance: patient     Diagnoses:   1. Anxiety disorder due to medical condition          Symptoms currently being addressed in therapy: anxiety     Therapeutic Intervention(s): CBT. Support, problem solving     Treatment Goal(s)/Objective(s) addressed: Asking for help, being clear with her needs     Progress toward Treatment Goals: No change     Plan:      - Continue monthly therapy.  Recommended support services for caregivers, alzheimer's association and [facility], recommend CBTI for sleep issues, pt. Will follow up.     Oanh Carlisle, Ph.D.

## 2023-05-19 RX ORDER — LEVOTHYROXINE SODIUM 0.05 MG/1
TABLET ORAL
Qty: 100 TABLET | Refills: 3 | Status: SHIPPED | OUTPATIENT
Start: 2023-05-19

## 2023-05-19 RX ORDER — MONTELUKAST SODIUM 10 MG/1
10 TABLET ORAL DAILY
Qty: 100 TABLET | Refills: 3 | Status: SHIPPED | OUTPATIENT
Start: 2023-05-19 | End: 2024-01-16

## 2023-05-23 ENCOUNTER — OFFICE VISIT (OUTPATIENT)
Dept: MEDICAL GROUP | Facility: CLINIC | Age: 73
End: 2023-05-23
Payer: MEDICARE

## 2023-05-23 DIAGNOSIS — F06.4 ANXIETY DISORDER DUE TO MEDICAL CONDITION: ICD-10-CM

## 2023-05-23 PROCEDURE — 90834 PSYTX W PT 45 MINUTES: CPT | Performed by: PSYCHOLOGIST

## 2023-05-23 NOTE — PROGRESS NOTES
Full therapy note has been documented and is under restricted viewing.  Please see below for summary of today's session.      Patient Name: Felicia Schaefer  Patient MRN: [id number]  Today's Date:  5/23/23     Type of session: individual psychotherapy  Session start time: 2  Session stop time: 2:45  Length of time spent face to face with patient: 45  Persons in attendance: patient     Diagnoses:   1. Anxiety disorder due to medical condition          Symptoms currently being addressed in therapy: anxiety     Therapeutic Intervention(s): CBT. Support, problem solving     Treatment Goal(s)/Objective(s) addressed: Asking for help, being clear with her needs, setting boundaries     Progress toward Treatment Goals: No change     Plan:      - Continue   therapy  in two weeks.    Oanh Carlisle, Ph.D.
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
## 2023-05-31 ENCOUNTER — OFFICE VISIT (OUTPATIENT)
Dept: MEDICAL GROUP | Facility: CLINIC | Age: 73
End: 2023-05-31
Payer: MEDICARE

## 2023-05-31 DIAGNOSIS — R79.89 LOW VITAMIN D LEVEL: ICD-10-CM

## 2023-05-31 DIAGNOSIS — E11.42 TYPE 2 DIABETES MELLITUS WITH DIABETIC POLYNEUROPATHY, WITHOUT LONG-TERM CURRENT USE OF INSULIN (HCC): ICD-10-CM

## 2023-05-31 DIAGNOSIS — E03.9 ACQUIRED HYPOTHYROIDISM: ICD-10-CM

## 2023-05-31 DIAGNOSIS — Z63.6 CAREGIVER STRESS: ICD-10-CM

## 2023-05-31 DIAGNOSIS — I10 ESSENTIAL HYPERTENSION, BENIGN: ICD-10-CM

## 2023-05-31 DIAGNOSIS — E78.2 MIXED HYPERLIPIDEMIA: ICD-10-CM

## 2023-05-31 PROCEDURE — 3079F DIAST BP 80-89 MM HG: CPT | Performed by: FAMILY MEDICINE

## 2023-05-31 PROCEDURE — 3077F SYST BP >= 140 MM HG: CPT | Performed by: FAMILY MEDICINE

## 2023-05-31 PROCEDURE — 99214 OFFICE O/P EST MOD 30 MIN: CPT | Performed by: FAMILY MEDICINE

## 2023-05-31 RX ORDER — PRAVASTATIN SODIUM 20 MG
20 TABLET ORAL NIGHTLY
Qty: 30 TABLET | Refills: 11 | Status: SHIPPED | OUTPATIENT
Start: 2023-05-31 | End: 2023-06-01

## 2023-05-31 SDOH — SOCIAL STABILITY - SOCIAL INSECURITY: DEPENDENT RELATIVE NEEDING CARE AT HOME: Z63.6

## 2023-05-31 ASSESSMENT — FIBROSIS 4 INDEX: FIB4 SCORE: 1.21

## 2023-05-31 NOTE — ASSESSMENT & PLAN NOTE
Feels better. A bit hypertensive today. Home fasting glucose . Last a1c 6.2 2 months ago. Pt would like to increase to max of victoza 1.8mg and brings in a research study showing that max dose of victoza enhances blood pressure control.

## 2023-05-31 NOTE — ASSESSMENT & PLAN NOTE
Will repeat lipid panel. Pt would like to increase statin and this is appropriate given she should be on high intensity statin. Increased to 20 mg pravastatin.

## 2023-06-01 VITALS
WEIGHT: 163 LBS | HEART RATE: 88 BPM | RESPIRATION RATE: 16 BRPM | DIASTOLIC BLOOD PRESSURE: 80 MMHG | BODY MASS INDEX: 27.83 KG/M2 | OXYGEN SATURATION: 95 % | SYSTOLIC BLOOD PRESSURE: 140 MMHG | HEIGHT: 64 IN
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
## 2023-06-01 NOTE — ASSESSMENT & PLAN NOTE
Running a bit high in the office today on two separate measurements. She will measure BP at home and report back at next visit for possible increase on BP medication management.

## 2023-06-11 DIAGNOSIS — E11.42 TYPE 2 DIABETES MELLITUS WITH DIABETIC POLYNEUROPATHY, WITHOUT LONG-TERM CURRENT USE OF INSULIN (HCC): ICD-10-CM

## 2023-06-12 RX ORDER — PEN NEEDLE, DIABETIC 32 GX 1/4"
NEEDLE, DISPOSABLE MISCELLANEOUS
Qty: 100 EACH | Refills: 3 | Status: SHIPPED | OUTPATIENT
Start: 2023-06-12

## 2023-06-13 ENCOUNTER — OFFICE VISIT (OUTPATIENT)
Dept: MEDICAL GROUP | Facility: CLINIC | Age: 73
End: 2023-06-13
Payer: MEDICARE

## 2023-06-13 DIAGNOSIS — F06.4 ANXIETY DISORDER DUE TO MEDICAL CONDITION: ICD-10-CM

## 2023-06-13 PROCEDURE — 90834 PSYTX W PT 45 MINUTES: CPT | Performed by: PSYCHOLOGIST

## 2023-06-13 NOTE — PSYCHOTHERAPY
Doing well, taking steps around her own health and around her trust.  Feels more empowered.    STable.      Pt. To schedule prn.

## 2023-08-01 RX ORDER — LOSARTAN POTASSIUM 100 MG/1
100 TABLET ORAL DAILY
Qty: 100 TABLET | Refills: 3 | Status: SHIPPED | OUTPATIENT
Start: 2023-08-01

## 2023-08-14 ENCOUNTER — OFFICE VISIT (OUTPATIENT)
Dept: MEDICAL GROUP | Facility: CLINIC | Age: 73
End: 2023-08-14
Payer: MEDICARE

## 2023-08-14 DIAGNOSIS — F06.4 ANXIETY DISORDER DUE TO MEDICAL CONDITION: ICD-10-CM

## 2023-08-14 PROCEDURE — 90834 PSYTX W PT 45 MINUTES: CPT | Performed by: PSYCHOLOGIST

## 2023-08-14 NOTE — PSYCHOTHERAPY
Discusses family issues contributing to stress.  Worked on self care and on barriers to self care.    STable.    See in two weeks.

## 2023-08-14 NOTE — PROGRESS NOTES
Full therapy note has been documented and is under restricted viewing.  Please see below for summary of today's session.      Patient Name: Felicia Schaefer  Patient MRN: [id number]  Today's Date:  8/14/23     Type of session: individual psychotherapy  Session start time: 10  Session stop time: 10:45  Length of time spent face to face with patient: 45  Persons in attendance: patient     Diagnoses:   1. Anxiety disorder due to medical condition          Symptoms currently being addressed in therapy: anxiety     Therapeutic Intervention(s): CBT. Support, problem solving     Treatment Goal(s)/Objective(s) addressed: Asking for help, being clear with her needs, setting boundaries     Progress toward Treatment Goals: improved   Plan:      - Continue   therapy  in two weeks.    Oanh Carlisle, Ph.D.

## 2023-08-23 ENCOUNTER — APPOINTMENT (OUTPATIENT)
Dept: MEDICAL GROUP | Facility: CLINIC | Age: 73
End: 2023-08-23
Payer: MEDICARE

## 2023-08-28 ENCOUNTER — OFFICE VISIT (OUTPATIENT)
Dept: MEDICAL GROUP | Facility: CLINIC | Age: 73
End: 2023-08-28
Payer: MEDICARE

## 2023-08-28 DIAGNOSIS — F06.4 ANXIETY DISORDER DUE TO MEDICAL CONDITION: ICD-10-CM

## 2023-08-28 PROCEDURE — 90834 PSYTX W PT 45 MINUTES: CPT | Performed by: PSYCHOLOGIST

## 2023-08-28 NOTE — PSYCHOTHERAPY
Discussing relationship with her son, full of grief and awareness about the complexity of their relationship.    Stable    See in two weeks.

## 2023-08-28 NOTE — PROGRESS NOTES
Full therapy note has been documented and is under restricted viewing.  Please see below for summary of today's session.      Patient Name: Felicia Schaefer  Patient MRN: [id number]  Today's Date:  8/28/23     Type of session: individual psychotherapy  Session start time: 10  Session stop time: 10:45  Length of time spent face to face with patient: 45  Persons in attendance: patient     Diagnoses:   1. Anxiety disorder due to medical condition          Symptoms currently being addressed in therapy: anxiety     Therapeutic Intervention(s): CBT. Support, problem solving     Treatment Goal(s)/Objective(s) addressed: Asking for help, being clear with her needs, setting boundaries     Progress toward Treatment Goals: improved   Plan:      - Continue   therapy  in two weeks.    Oanh Carlisle, Ph.D.

## 2023-09-11 ENCOUNTER — OFFICE VISIT (OUTPATIENT)
Dept: MEDICAL GROUP | Facility: CLINIC | Age: 73
End: 2023-09-11
Payer: MEDICARE

## 2023-09-11 DIAGNOSIS — F06.4 ANXIETY DISORDER DUE TO MEDICAL CONDITION: ICD-10-CM

## 2023-09-11 PROCEDURE — 90834 PSYTX W PT 45 MINUTES: CPT | Performed by: PSYCHOLOGIST

## 2023-09-11 NOTE — PSYCHOTHERAPY
Reports being tired, company was hard and discusses her almost 50 year marriage and its difficulties.    Stable    See in two weeks.

## 2023-09-11 NOTE — PROGRESS NOTES
Full therapy note has been documented and is under restricted viewing.  Please see below for summary of today's session.      Patient Name: Felicia Schaefer  Patient MRN: [id number]  Today's Date:  9/11/23     Type of session: individual psychotherapy  Session start time: 1  Session stop time: 1:45  Length of time spent face to face with patient: 45  Persons in attendance: patient     Diagnoses:   1. Anxiety disorder due to medical condition          Symptoms currently being addressed in therapy: anxiety     Therapeutic Intervention(s): CBT. Support, problem solving     Treatment Goal(s)/Objective(s) addressed: Asking for help, being clear with her needs, setting boundaries     Progress toward Treatment Goals: improved   Plan:      - Continue   therapy  in two weeks.    Oanh Carlisle, Ph.D.

## 2023-09-26 ENCOUNTER — OFFICE VISIT (OUTPATIENT)
Dept: MEDICAL GROUP | Facility: CLINIC | Age: 73
End: 2023-09-26
Payer: MEDICARE

## 2023-09-26 DIAGNOSIS — F06.4 ANXIETY DISORDER DUE TO MEDICAL CONDITION: ICD-10-CM

## 2023-09-26 PROCEDURE — 90834 PSYTX W PT 45 MINUTES: CPT | Performed by: PSYCHOLOGIST

## 2023-09-26 NOTE — PROGRESS NOTES
Full therapy note has been documented and is under restricted viewing.  Please see below for summary of today's session.      Patient Name: Felicia Schaefer  Patient MRN: [id number]  Today's Date:  9/26/23     Type of session: individual psychotherapy  Session start time: 11  Session stop time: 11:45  Length of time spent face to face with patient: 45  Persons in attendance: patient     Diagnoses:   1. Anxiety disorder due to medical condition          Symptoms currently being addressed in therapy: anxiety     Therapeutic Intervention(s): CBT. Support, problem solving     Treatment Goal(s)/Objective(s) addressed: Asking for help, being clear with her needs, setting boundaries     Progress toward Treatment Goals: improved   Plan:      - Continue   therapy  in two weeks.    Oanh Carlisle, Ph.D.

## 2023-09-26 NOTE — PSYCHOTHERAPY
Struggling with physical pain after a fall.  Continuing with thinking about the ranch and the future.  Coping well.    See in two weeks.

## 2023-10-09 ENCOUNTER — OFFICE VISIT (OUTPATIENT)
Dept: MEDICAL GROUP | Facility: CLINIC | Age: 73
End: 2023-10-09
Payer: MEDICARE

## 2023-10-09 DIAGNOSIS — F06.4 ANXIETY DISORDER DUE TO MEDICAL CONDITION: ICD-10-CM

## 2023-10-09 PROCEDURE — 90834 PSYTX W PT 45 MINUTES: CPT | Performed by: PSYCHOLOGIST

## 2023-10-09 NOTE — PROGRESS NOTES
Full therapy note has been documented and is under restricted viewing.  Please see below for summary of today's session.      Patient Name: Felicia Schaefer  Patient MRN: [id number]  Today's Date:  10/9/23     Type of session: individual psychotherapy  Session start time: 11  Session stop time: 11:45  Length of time spent face to face with patient: 45  Persons in attendance: patient     Diagnoses:   1. Anxiety disorder due to medical condition          Symptoms currently being addressed in therapy: anxiety     Therapeutic Intervention(s): CBT. Support, problem solving     Treatment Goal(s)/Objective(s) addressed: Asking for help, being clear with her needs, setting boundaries     Progress toward Treatment Goals: improved   Plan:      - Continue   therapy  in two weeks.    Oanh Carlisle, Ph.D.

## 2023-10-09 NOTE — PSYCHOTHERAPY
Relieved as ranch/financial situation is more comfortable now.  Working on health care issues.      Stable    See in two weeks.

## 2023-10-10 ENCOUNTER — OFFICE VISIT (OUTPATIENT)
Dept: BEHAVIORAL HEALTH | Facility: PSYCHIATRIC FACILITY | Age: 73
End: 2023-10-10
Payer: MEDICARE

## 2023-10-10 DIAGNOSIS — G47.01 INSOMNIA DUE TO MEDICAL CONDITION: ICD-10-CM

## 2023-10-10 PROCEDURE — 90834 PSYTX W PT 45 MINUTES: CPT | Performed by: PSYCHOLOGIST

## 2023-10-19 NOTE — PSYCHOTHERAPY
"Procedure: Consultation/Intake Session for Insomnia   .  S) This is the 1st follow-up for the 73-year-old female who was referred by Dr. Carlisle for the treatment of insomnia. Patient reported experiencing longstanding difficulty initiating and maintaining sleep. These difficulties were precipitated by an experience with a horse accident, during which patient suffered ankle injuries. Her sleep picture is currently complicated by the following:  -Severe chronic pain  -Care taking of a  with dementia   -Patient's suspicions of having MS    Patient reports going to bed at various times, but usually between 8 pm and 9 pm. She reports experiencing conditioned arousal and a sleep onset latency of approximately \"a few hours.\" She watches television from bed, has difficulty getting out of bed due to fear of falling and increases in pain, and she is at times awakened by pain, and by her two cats. Other sleep stressors include hypervigilance around her 's state given his health problems. Final wake up time is usually between 3 and 5 am. Patient agreed to fill out sleep diaries to provide a more accurate picture of her sleep patterns.     Ms. Schaefer also experiences anxious and depressed mood. She is currently addressing this with her her Psychologist, Dr. Oanh Carlisle.     JORDY: 27 (clinical insomnia - severe)  SNQ- 14 (severe drowsiness)     O)   Appearance:  Well-groomed, appropriately dressed for the weather conditions.   Speech: Normal rate, rhythm, volume, and prosody.   Mood: Current mood described as   Affect: Congruent/Reactive  Thought Process: Logical/linear  Thought Content: Within Normal Limits  Insight/Judgment: Intact/adequate  Cognition/Orientation: Behaviorally Stable, Alert Oriented x 4  Behavior: Appropriate level of self-disclosure         A) Patient is a 73-year-old female who was referred by Dr. Carlisle for the treatment of insomnia. She engages in behaviors that contribute to the " maintenance of her clinical insomnia. This patient's case is complicated by multiple factors, including chronic pain, which makes it difficult for her to engage in stimulus control or sleep restriction, as well as environmental stressors, a  with dementia, and cats in the bedroom environment that wake her up throughout the night. Patient meets diagnostic criteria for insomnia. She agreed to fill out sleep diary and bring it to our next session.      Diagnostic Impression:  Primary Insomnia     P)   1. Patient will continue psychotherapy with Dr. Carlisle.  2. Patient will bring sleep diaries and we will review the behavioral conceptualization of insomnia.

## 2023-10-19 NOTE — PROGRESS NOTES
[facility]  Psychotherapy Summary Note    Full therapy note has been documented and is under restricted viewing.  Please see below for summary of today's session.     Patient Name: Felicia Schaefer  Patient MRN: [id number]  Today's Date:  10/10/2023    Type of session: intake assessment  Session start time: 3:00 pm  Session stop time: 4:15 pm  Length of time spent face to face with patient: 75 minutes  Persons in attendance: patient    Diagnoses: Insomnia    Symptoms currently being addressed in therapy:  insomnia    Therapeutic Intervention(s): CBT    Treatment Goal(s)/Objective(s) addressed: To address insomnia symptoms via use of CBT-I      Progress toward Treatment Goals: N/A - This was an intake session.     Plan:  Return to clinic two weeks from today's session. Bring sleep diary data.     Anayansi Lombardero, Ph.D.

## 2023-10-23 ENCOUNTER — OFFICE VISIT (OUTPATIENT)
Dept: MEDICAL GROUP | Facility: CLINIC | Age: 73
End: 2023-10-23
Payer: MEDICARE

## 2023-10-23 DIAGNOSIS — F06.4 ANXIETY DISORDER DUE TO MEDICAL CONDITION: ICD-10-CM

## 2023-10-23 PROCEDURE — 90834 PSYTX W PT 45 MINUTES: CPT | Performed by: PSYCHOLOGIST

## 2023-10-23 NOTE — PROGRESS NOTES
Full therapy note has been documented and is under restricted viewing.  Please see below for summary of today's session.      Patient Name: Felicia Schaefer  Patient MRN: [id number]  Today's Date:  10/23/23     Type of session: individual psychotherapy  Session start time: 11  Session stop time: 11:45  Length of time spent face to face with patient: 45  Persons in attendance: patient     Diagnoses:   1. Anxiety disorder due to medical condition          Symptoms currently being addressed in therapy: anxiety     Therapeutic Intervention(s): CBT. Support, problem solving     Treatment Goal(s)/Objective(s) addressed: Asking for help, being clear with her needs, setting boundaries     Progress toward Treatment Goals: improved   Plan:      - Continue   therapy  in two weeks.    Oanh Carlisle, Ph.D.

## 2023-10-23 NOTE — PSYCHOTHERAPY
Stressed and grieving over issues she has to deal with with her trust.  Struggles to cope with some difficult realities.    Stable    See in two weeks.

## 2023-11-06 ENCOUNTER — OFFICE VISIT (OUTPATIENT)
Dept: BEHAVIORAL HEALTH | Facility: PSYCHIATRIC FACILITY | Age: 73
End: 2023-11-06
Payer: MEDICARE

## 2023-11-06 ENCOUNTER — APPOINTMENT (OUTPATIENT)
Dept: MEDICAL GROUP | Facility: CLINIC | Age: 73
End: 2023-11-06
Payer: MEDICARE

## 2023-11-06 DIAGNOSIS — G47.01 INSOMNIA DUE TO MEDICAL CONDITION: ICD-10-CM

## 2023-11-06 PROCEDURE — 90834 PSYTX W PT 45 MINUTES: CPT | Performed by: PSYCHOLOGIST

## 2023-11-07 ENCOUNTER — OFFICE VISIT (OUTPATIENT)
Dept: MEDICAL GROUP | Facility: CLINIC | Age: 73
End: 2023-11-07
Payer: MEDICARE

## 2023-11-07 DIAGNOSIS — F06.4 ANXIETY DISORDER DUE TO MEDICAL CONDITION: ICD-10-CM

## 2023-11-07 PROCEDURE — 90834 PSYTX W PT 45 MINUTES: CPT | Performed by: PSYCHOLOGIST

## 2023-11-07 NOTE — PROGRESS NOTES
Full therapy note has been documented and is under restricted viewing.  Please see below for summary of today's session.      Patient Name: Felicia Schaefer  Patient MRN: [id number]  Today's Date:  11/7/23     Type of session: individual psychotherapy  Session start time: 11  Session stop time: 11:45  Length of time spent face to face with patient: 45  Persons in attendance: patient     Diagnoses:   1. Anxiety disorder due to medical condition          Symptoms currently being addressed in therapy: anxiety     Therapeutic Intervention(s): CBT. Support, problem solving     Treatment Goal(s)/Objective(s) addressed: Asking for help, being clear with her needs, setting boundaries     Progress toward Treatment Goals: improved   Plan:      - Continue   therapy  in two weeks.    Oanh Carlisle, Ph.D.

## 2023-11-08 NOTE — PROGRESS NOTES
[facility]  Psychotherapy Summary Note     Full therapy note has been documented and is under restricted viewing.  Please see below for summary of today's session.      Patient Name: Felicia Schaefer  Patient MRN: [id number]  Today's Date:  11/06/2023     Type of session: follow-up psychotherapy session  Session start time: 3:00 pm  Session stop time: 3:45 pm  Length of time spent face to face with patient: 75 minutes  Persons in attendance: patient     Diagnoses: Insomnia     Symptoms currently being addressed in therapy:  insomnia     Therapeutic Intervention(s): CBT     Treatment Goal(s)/Objective(s) addressed: To address insomnia symptoms via use of CBT-I       Progress toward Treatment Goals: N/A - This was the first psychotherapy session.      Plan:  Return to clinic one week from today's session. Bring sleep diary data.      Anayansi Lombardero, Ph.D.

## 2023-11-08 NOTE — PSYCHOTHERAPY
PROGRESS NOTE    Procedure: 45-minute behavioral health treatment of insomnia   .  S) This is the 1st follow-up for the 73-year-old female who was referred by Dr. Carlisle for the treatment of insomnia.  Patient brought sleep diary data that was in narrative form, which was not appropriate for sleep efficiency calculations. Reviewed the behavioral conceptualization of insomnia with patient. Reviewed instructions for sleep diary data and discussed treatment limitations associated with patient's chronic pain and pets and caregiving circumstances. Agreed to look at stimulus control, relaxation, and circadian placement as potential treatment targets. Patient agreed to remove the clock from her bedroom. She will continue to sleep with her pets, and she has a television that she watches in bed, which we will continue to discuss over the next few sessions.     O)   Appearance:  Well-groomed, appropriately dressed for the weather conditions.   Speech: Normal rate, rhythm, volume, and prosody.   Mood: Current mood described as   Affect: Congruent/Reactive  Thought Process: Logical/linear  Thought Content: Within Normal Limits  Insight/Judgment: Intact/adequate  Cognition/Orientation: Behaviorally Stable, Alert Oriented x 4  Behavior: Appropriate level of self-disclosure        A) Patient is a 73-year-old female who was referred by Dr. Calrisle for the treatment of insomnia. She engages in behaviors that contribute to the maintenance of her clinical insomnia. This patient's case is complicated by multiple factors, including chronic pain, which makes it difficult for her to engage in stimulus control or sleep restriction, as well as environmental stressors, a  with dementia, and cats in the bedroom environment that wake her up. We will address and encourage patient to make as many changes as she can realistically make in order to improve her sleep.     Diagnostic Impression:  Primary Insomnia    P)   1. RTC: 1 week  from today's session.

## 2023-11-13 ENCOUNTER — OFFICE VISIT (OUTPATIENT)
Dept: BEHAVIORAL HEALTH | Facility: PSYCHIATRIC FACILITY | Age: 73
End: 2023-11-13
Payer: MEDICARE

## 2023-11-13 DIAGNOSIS — G47.01 INSOMNIA DUE TO MEDICAL CONDITION: ICD-10-CM

## 2023-11-13 PROCEDURE — 90834 PSYTX W PT 45 MINUTES: CPT | Performed by: PSYCHOLOGIST

## 2023-11-14 NOTE — PROGRESS NOTES
[facility]  Psychotherapy Summary Note     Full therapy note has been documented and is under restricted viewing.  Please see below for summary of today's session.      Patient Name: Felicia Schaefer  Patient MRN: [id number]  Today's Date:  11/13/2023     Type of session: follow-up psychotherapy session  Session start time: 3:00 pm  Session stop time: 3:45 pm  Length of time spent face to face with patient: 45 minutes  Persons in attendance: patient     Diagnoses: Insomnia     Symptoms currently being addressed in therapy:  insomnia     Therapeutic Intervention(s): CBT     Treatment Goal(s)/Objective(s) addressed: To address insomnia symptoms via use of CBT-I       Progress toward Treatment Goals: N/A - This was a follow-up visit to discuss sleep strategies that patient can implement with the help of Dr. Carlisle.       Plan:  Patient will continue psychotherapy with Dr. Carlisle and will engage in stimulus control with Dr. Carlisle's help.     Anayansi Lombardero, Ph.D.

## 2023-11-14 NOTE — PSYCHOTHERAPY
PROGRESS NOTE     Procedure: 45-minute behavioral health treatment of insomnia   .  S) This is the 1st follow-up for the 73-year-old female who was referred by Dr. Carlisle for the treatment of insomnia.  Patient said she did not fill out sleep diary data, as she did not think this would be helpful or accurate. Validated patient's complicating factors in her sleep, including pain that does not appear to be well-controlled according to patient. Discussed stimulus control strategies, gave patient a pamphlet with suggestions for optimizing sleep, and agreed that patient would continue to work on her sleep with Dr. Carlisle's help.      O)   Appearance:  Well-groomed, appropriately dressed for the weather conditions.   Speech: Normal rate, rhythm, volume, and prosody.   Mood: Current mood described as   Affect: Congruent/Reactive  Thought Process: Logical/linear  Thought Content: Within Normal Limits  Insight/Judgment: Intact/adequate  Cognition/Orientation: Behaviorally Stable, Alert Oriented x 4  Behavior: Appropriate level of self-disclosure         A) Patient is a 73-year-old female who was referred by Dr. Carlisle for the treatment of insomnia. She engages in behaviors that contribute to the maintenance of her clinical insomnia. This patient's case is complicated by multiple factors, including chronic pain, which makes it difficult for her to engage in stimulus control or sleep restriction, as well as environmental stressors, a  with dementia, and cats in the bedroom environment that wake her up. We will address and encourage patient to make as many changes as she can realistically make in order to improve her sleep.      Diagnostic Impression:  Primary Insomnia     P)   1. Patient will continue psychotherapy with Dr. Carlisle, and they will review stimulus control strategies.

## 2023-11-21 ENCOUNTER — PATIENT MESSAGE (OUTPATIENT)
Dept: MEDICAL GROUP | Facility: CLINIC | Age: 73
End: 2023-11-21
Payer: MEDICARE

## 2023-11-21 DIAGNOSIS — F06.4 ANXIETY DISORDER DUE TO MEDICAL CONDITION: ICD-10-CM

## 2023-11-21 DIAGNOSIS — F13.20 ANXIOLYTIC DEPENDENCE WITH CURRENT USE (HCC): ICD-10-CM

## 2023-11-21 RX ORDER — CLONAZEPAM 1 MG/1
1 TABLET ORAL 3 TIMES DAILY PRN
Qty: 90 TABLET | Refills: 0 | Status: SHIPPED | OUTPATIENT
Start: 2023-11-21 | End: 2023-12-21

## 2023-11-21 RX ORDER — CLONAZEPAM 1 MG/1
1 TABLET ORAL 3 TIMES DAILY PRN
COMMUNITY
End: 2023-11-21 | Stop reason: SDUPTHER

## 2023-11-22 NOTE — PROGRESS NOTES
Refilled clonazepam for 1 month #90, needs appointment for further..  Pt has been counseled on risks of benzo+ opiates, has tolerated this dosing for many years, she is aware of potential for falls, oversedation, death. We will revisit the continued use of this medication at her next visit.

## 2023-12-12 ENCOUNTER — OFFICE VISIT (OUTPATIENT)
Dept: MEDICAL GROUP | Facility: CLINIC | Age: 73
End: 2023-12-12
Payer: MEDICARE

## 2023-12-12 DIAGNOSIS — F06.4 ANXIETY DISORDER DUE TO MEDICAL CONDITION: ICD-10-CM

## 2023-12-12 PROCEDURE — 90834 PSYTX W PT 45 MINUTES: CPT | Performed by: PSYCHOLOGIST

## 2023-12-12 NOTE — PROGRESS NOTES
Full therapy note has been documented and is under restricted viewing.  Please see below for summary of today's session.      Patient Name: Felicia Schaefer  Patient MRN: [id number]  Today's Date:  12/12/23     Type of session: individual psychotherapy  Session start time: 11  Session stop time: 11:45  Length of time spent face to face with patient: 45  Persons in attendance: patient     Diagnoses:   1. Anxiety disorder due to medical condition          Symptoms currently being addressed in therapy: anxiety     Therapeutic Intervention(s): CBT. Support, problem solving     Treatment Goal(s)/Objective(s) addressed: Asking for help, being clear with her needs, setting boundaries     Progress toward Treatment Goals: improved   Plan:      - Continue   therapy  in three weeks.    Oanh Carlisle, Ph.D.

## 2023-12-12 NOTE — PSYCHOTHERAPY
Discusses stress around money, her son, her medical issues.  Stressed and also coping.  Interested in [facility] Serrano  See in three weeks.

## 2023-12-18 ENCOUNTER — TELEMEDICINE (OUTPATIENT)
Dept: MEDICAL GROUP | Facility: CLINIC | Age: 73
End: 2023-12-18
Payer: MEDICARE

## 2023-12-18 DIAGNOSIS — Z11.59 ENCOUNTER FOR HEPATITIS C SCREENING TEST FOR LOW RISK PATIENT: ICD-10-CM

## 2023-12-18 DIAGNOSIS — Z12.31 ENCOUNTER FOR SCREENING MAMMOGRAM FOR MALIGNANT NEOPLASM OF BREAST: ICD-10-CM

## 2023-12-18 DIAGNOSIS — E11.42 TYPE 2 DIABETES MELLITUS WITH DIABETIC POLYNEUROPATHY, WITHOUT LONG-TERM CURRENT USE OF INSULIN (HCC): ICD-10-CM

## 2023-12-18 DIAGNOSIS — M81.0 AGE-RELATED OSTEOPOROSIS WITHOUT CURRENT PATHOLOGICAL FRACTURE: ICD-10-CM

## 2023-12-18 DIAGNOSIS — E03.9 ACQUIRED HYPOTHYROIDISM: ICD-10-CM

## 2023-12-18 PROCEDURE — 99213 OFFICE O/P EST LOW 20 MIN: CPT | Mod: 95 | Performed by: FAMILY MEDICINE

## 2023-12-19 NOTE — ASSESSMENT & PLAN NOTE
Pt has concerns related to AM blood sugars being more labile with some readings in the 200s. Most recent A1C 6.5 a few weeks ago. She is wondering if the victoza needs to be increased but she is already at max dose. 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 Estranged from her youngest, who is an alcoholic. We discussed the relationship between stress--cortisol--blood sugar and how these are interrelated. It does not appear that she has much in the way of stress relief coming her way. She visits regularly with Dr. Carlisle for psychological support and finds that hugely helpful. For now, we will check TSH to ensure that the dose change we made is going okay and is not contributing to labile blood sugars, and ride things out till the next A1C check. It is also time to refresh her other labs, so we have ordered those. Close follow up.

## 2023-12-19 NOTE — PROGRESS NOTES
Telemedicine Video Visit: Established Patient   This Remote Face to Face encounter was conducted via Zoom. Given the importance of social distancing and other strategies recommended to reduce the risk of COVID-19 transmission, I am providing medical care to this patient via audio/video visit in place of an in person visit at the request of the patient. Verbal consent to telehealth, risks, benefits, and consequences were discussed. Patient retains the right to withdraw at any time. All existing confidentiality protections apply. The patient has access to all transmitted medical information. No dissemination of any patient images or information to other entities without further written consent.  Subjective:     Chief Complaint   Patient presents with    Follow-Up       Felicia Schaefer is a 73 y.o. 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 Most recent A1C 6.5 a few weeks ago. She is wondering if the victoza needs to be increased but she is already at max dose. 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 Estranged from her youngest, who is an alcoholic. We discussed the relationship between stress--cortisol--blood sugar and how these are interrelated. It does not appear that she has much in the way of stress relief coming her way. She visits regularly with Dr. Carlisle for psychological support and finds that hugely helpful. For now, we will check TSH to ensure that the dose change we made is going okay and is not contributing to labile blood sugars, and ride things out till the next A1C check. It is also time to refresh her other labs, so we have ordered those. Close follow up.         Allergies   Allergen Reactions     Ondansetron Anaphylaxis    Penicillins Rash    Tramadol Rash    Lyrica     Pregabalin     Rivaroxaban Palpitations and Shortness of Breath    Warfarin Anxiety, Itching, Nausea, Palpitations, Rash, Runny Nose and Vomiting       Current medicines (including changes today)  Current Outpatient Medications   Medication Sig Dispense Refill    liraglutide (VICTOZA) 18 MG/3ML Solution Pen-injector Inject 1.8 mg under the skin every day. 30 mL 3    NOVOFINE PEN NEEDLE 32G X 6 MM Misc USE 1 PEN NEEDLE DAILY 100 Each 3    clonazePAM (KLONOPIN) 1 MG Tab Take 1 Tablet by mouth 3 times a day as needed (severe anxiety) for up to 30 days. 90 Tablet 0    losartan (COZAAR) 100 MG Tab TAKE 1 TABLET BY MOUTH EVERY  Tablet 3    pravastatin (PRAVACHOL) 20 MG Tab TAKE 1 TABLET BY MOUTH EVERY EVENING 90 Tablet 3    montelukast (SINGULAIR) 10 MG Tab TAKE 1 TABLET BY MOUTH EVERY  Tablet 3    levothyroxine (SYNTHROID) 50 MCG Tab TAKE 1 TABLET BY MOUTH EVERY  Tablet 3    Aug Betamethasone Dipropionate (DIPROLENE-AF) 0.05 % Cream Apply 1 Application. topically.      aspirin (ASA) 81 MG Chew Tab chewable tablet Chew 81 mg every day.      glucose blood (CONTOUR NEXT TEST) strip 1 Strip by Other route in the morning, at noon, and at bedtime. 300 Strip 3    Lancets 30G Misc 1 Each in the morning, at noon, and at bedtime. 100 Each 11    cyclobenzaprine (FLEXERIL) 10 mg Tab Take 10 mg by mouth 2 times a day as needed.      ibuprofen (MOTRIN) 800 MG Tab Take 800 mg by mouth every 8 hours as needed.      levalbuterol (XOPENEX HFA) 45 MCG/ACT inhaler levalbuterol HFA 45 mcg/actuation aerosol inhaler      oxyCODONE-acetaminophen (PERCOCET-10)  MG Tab Take  Tablets by mouth.      pantoprazole (PROTONIX) 40 MG Tablet Delayed Response Take 1 Tablet by mouth every day.       No current facility-administered medications for this visit.       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) 02/08/2022    Acquired hypothyroidism 10/18/2021    Anxiety disorder due to medical condition 10/18/2021    Anxiolytic dependence with current use (HCC) 10/18/2021    Combined hyperlipidemia associated with type 2 diabetes mellitus (HCC) 10/18/2021    Diabetic polyneuropathy associated with type 2 diabetes mellitus (HCC) 10/18/2021    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 No pertinent family history.    She  has a past medical history of Diabetes ([facility]).  She  has no past surgical history on file.       Objective:   Vitals obtained by patient:  There were no vitals taken for this visit.    Const: Alert, well nourished, in NAD  HEENT: EOMI, NC/AT  Resp: Normal WOB  CV: No cyanosis observed  Skin: Visible skin without rash  Psych: Depressed mood, periodically tearful, normal attention span, speech linear and logical        Assessment and Plan:   The following treatment plan was discussed:     1. Acquired hypothyroidism  - TSH; Future    2. Type 2 diabetes mellitus with diabetic polyneuropathy, without long-term current use of insulin ([facility])  - liraglutide (VICTOZA) 18 MG/3ML Solution Pen-injector; Inject 1.8 mg under the skin every day.  Dispense: 30 mL; Refill: 3    3. Age-related osteoporosis without current pathological fracture  - DS-BONE DENSITY STUDY (DEXA); Future    4. Encounter for hepatitis C screening test for low  risk patient  - HEP C VIRUS ANTIBODY; Future  - HCV Scrn ( [id number] [employer] - Medicare Patients Only); Future    5. Encounter for screening mammogram for malignant neoplasm of breast  - MA-SCREENING MAMMO BILAT W/TOMOSYNTHESIS W/CAD; Future        Follow-up: Return in about 4 weeks (around 1/15/2024).    Face to Face Video Visit:   I spent 20 minutes with patient/guardian and I conducted this visit with audio and video present.  Shirley Keith M.D.

## 2024-01-02 ENCOUNTER — TELEPHONE (OUTPATIENT)
Dept: RADIOLOGY | Facility: MEDICAL CENTER | Age: 74
End: 2024-01-02
Payer: MEDICARE

## 2024-01-09 ENCOUNTER — OFFICE VISIT (OUTPATIENT)
Dept: MEDICAL GROUP | Facility: CLINIC | Age: 74
End: 2024-01-09
Payer: MEDICARE

## 2024-01-09 VITALS
HEART RATE: 81 BPM | BODY MASS INDEX: 26.12 KG/M2 | DIASTOLIC BLOOD PRESSURE: 84 MMHG | WEIGHT: 153 LBS | SYSTOLIC BLOOD PRESSURE: 156 MMHG | HEIGHT: 64 IN | OXYGEN SATURATION: 94 % | RESPIRATION RATE: 16 BRPM

## 2024-01-09 DIAGNOSIS — F33.2 ENDOGENOUS DEPRESSION (HCC): ICD-10-CM

## 2024-01-09 DIAGNOSIS — G62.9 NEUROPATHY: ICD-10-CM

## 2024-01-09 DIAGNOSIS — E11.42 DIABETIC POLYNEUROPATHY ASSOCIATED WITH TYPE 2 DIABETES MELLITUS (HCC): ICD-10-CM

## 2024-01-09 DIAGNOSIS — F06.4 ANXIETY DISORDER DUE TO MEDICAL CONDITION: ICD-10-CM

## 2024-01-09 PROCEDURE — 3079F DIAST BP 80-89 MM HG: CPT | Performed by: FAMILY MEDICINE

## 2024-01-09 PROCEDURE — 99214 OFFICE O/P EST MOD 30 MIN: CPT | Performed by: FAMILY MEDICINE

## 2024-01-09 PROCEDURE — 3077F SYST BP >= 140 MM HG: CPT | Performed by: FAMILY MEDICINE

## 2024-01-09 RX ORDER — CLONAZEPAM 1 MG/1
1 TABLET ORAL 3 TIMES DAILY PRN
Qty: 30 TABLET | Refills: 3 | Status: SHIPPED | OUTPATIENT
Start: 2024-01-09 | End: 2024-01-19

## 2024-01-09 ASSESSMENT — FIBROSIS 4 INDEX: FIB4 SCORE: 1.13
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
## 2024-01-15 ENCOUNTER — HOSPITAL ENCOUNTER (OUTPATIENT)
Dept: RADIOLOGY | Facility: MEDICAL CENTER | Age: 74
End: 2024-01-15
Payer: MEDICARE

## 2024-01-16 ENCOUNTER — OFFICE VISIT (OUTPATIENT)
Dept: MEDICAL GROUP | Facility: CLINIC | Age: 74
End: 2024-01-16
Payer: MEDICARE

## 2024-01-16 VITALS
HEIGHT: 64 IN | BODY MASS INDEX: 27.08 KG/M2 | HEART RATE: 83 BPM | TEMPERATURE: 96.8 F | WEIGHT: 158.6 LBS | OXYGEN SATURATION: 98 % | RESPIRATION RATE: 16 BRPM | DIASTOLIC BLOOD PRESSURE: 72 MMHG | SYSTOLIC BLOOD PRESSURE: 132 MMHG

## 2024-01-16 DIAGNOSIS — F33.2 ENDOGENOUS DEPRESSION (HCC): ICD-10-CM

## 2024-01-16 DIAGNOSIS — Z91.81 AT MODERATE RISK FOR FALL: ICD-10-CM

## 2024-01-16 DIAGNOSIS — E11.42 TYPE 2 DIABETES MELLITUS WITH DIABETIC POLYNEUROPATHY, WITHOUT LONG-TERM CURRENT USE OF INSULIN (HCC): ICD-10-CM

## 2024-01-16 PROCEDURE — 99214 OFFICE O/P EST MOD 30 MIN: CPT | Performed by: FAMILY MEDICINE

## 2024-01-16 PROCEDURE — 3078F DIAST BP <80 MM HG: CPT | Performed by: FAMILY MEDICINE

## 2024-01-16 PROCEDURE — 3075F SYST BP GE 130 - 139MM HG: CPT | Performed by: FAMILY MEDICINE

## 2024-01-16 ASSESSMENT — FIBROSIS 4 INDEX: FIB4 SCORE: 1.13

## 2024-01-17 NOTE — ASSESSMENT & PLAN NOTE
She is unable to get Victoza any more from the pharmacy. She has about a month left of her prescription. We will switch to semaglutide 1mg SC weekly and see if this will provide the same benefits given that it is in the same class.

## 2024-01-17 NOTE — PROGRESS NOTES
"CC:Diagnoses of At moderate risk for fall, Type 2 diabetes mellitus with diabetic polyneuropathy, without long-term current use of insulin (HCC), and Endogenous depression (HCC) were pertinent to this visit.      HISTORY OF PRESENT ILLNESS: Patient is a 73 y.o. female established patient who presents today to discuss the following:        Endogenous depression (HCC)  Adriana is here to discuss ongoing depression and caregiver stress. She states that she realizes she was \"in a bad way\". After our appointment last week she reached out to friends and family to reveal how depressed and suicidal she was. This had the effect of bringing her family closer to her, with her daughter now checking in with her every day, friends calling, and two sisters who live nearby came to her immediately bringing comfort items. This has been helpful. She brought in her clonazepam bottle to show me that she is adherent to the dosing and understands the concern I have with this medication. No evidence of misuse since last week according to pill counts. We discussed a slow taper of this medication over the next few months but she was concerned about re-emergence of her restless legs, which is why she was started on the clonazepam in the first place by another provider.  We reviewed the issue of tolerance to this medication and will continue to revisit the idea of tapering off very gently. Currently she takes one a day and does skip a day sometimes. Sleep is very disrupted right now. Her  sleeps a lot, and goes to bed very early; he wants her to go to bed as well with him, but she then finds she's awake half the night and ends up napping during the day. She asked about any medication that might help with insomnia, and we discussed that there is no medication that would be suitable for her given her opiate dependence and chronic BZD use.     She has a follow up appointment with Dr. Carlisle next Tuesday and I will see her in mid February. 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 She has about a month left of her prescription. We will switch to semaglutide 1mg SC weekly and see if this will provide the same benefits given that it is in the same class.     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) 02/08/2022    Acquired hypothyroidism 10/18/2021    Anxiety disorder due to medical condition 10/18/2021    Anxiolytic dependence with current use ([facility]) 10/18/2021    Combined hyperlipidemia associated with type 2 diabetes mellitus ([facility]) 10/18/2021    Diabetic polyneuropathy associated with type 2 diabetes mellitus ([facility]) 10/18/2021    Endogenous depression ([facility]) 10/18/2021    Essential hypertension, benign 10/18/2021    Gastroesophageal reflux disease 10/18/2021    Long term (current) use of opiate analgesic 10/18/2021    Mixed hyperlipidemia 10/18/2021    Other chronic pain 10/18/2021    Primary localized osteoarthrosis of ankle and foot 12/18/2019        Allergies:Ondansetron, Penicillins, Tramadol, Apixaban, Diprivan, Lyrica, Metoclopramide, Paroxetine, Pregabalin, Rivaroxaban, and Warfarin    Current Outpatient Medications   Medication Sig Dispense Refill    Semaglutide, 1 MG/DOSE, 4 MG/3ML Solution Pen-injector Inject 1 mg under the skin every 7 days. 3 mL 3    clonazePAM (KLONOPIN) 1 MG Tab Take 1 Tablet by mouth 3 times a day as needed (severe anxiety) for up to 10 days. Indications: Feeling Anxious 30 Tablet 3    losartan (COZAAR) 100 MG Tab TAKE 1 TABLET BY MOUTH EVERY  Tablet 3    NOVOFINE PEN NEEDLE 32G X 6 MM Misc USE 1 PEN  "NEEDLE DAILY 100 Each 3    pravastatin (PRAVACHOL) 20 MG Tab TAKE 1 TABLET BY MOUTH EVERY EVENING 90 Tablet 3    levothyroxine (SYNTHROID) 50 MCG Tab TAKE 1 TABLET BY MOUTH EVERY  Tablet 3    Aug Betamethasone Dipropionate (DIPROLENE-AF) 0.05 % Cream Apply 1 Application. topically.      aspirin (ASA) 81 MG Chew Tab chewable tablet Chew 81 mg every day.      glucose blood (CONTOUR NEXT TEST) strip 1 Strip by Other route in the morning, at noon, and at bedtime. 300 Strip 3    Lancets 30G Misc 1 Each in the morning, at noon, and at bedtime. 100 Each 11    cyclobenzaprine (FLEXERIL) 10 mg Tab Take 10 mg by mouth 2 times a day as needed.      ibuprofen (MOTRIN) 800 MG Tab Take 800 mg by mouth every 8 hours as needed.      levalbuterol (XOPENEX HFA) 45 MCG/ACT inhaler levalbuterol HFA 45 mcg/actuation aerosol inhaler      oxyCODONE-acetaminophen (PERCOCET-10)  MG Tab Take  Tablets by mouth.       No current facility-administered medications for this visit.       Social History     Tobacco Use    Smoking status: Never    Smokeless tobacco: Never   Vaping Use    Vaping Use: Former   Substance Use Topics    Alcohol use: Not Currently    Drug use: Not Currently     Social History     Social History Narrative    Not on file       No family history on file.    Exam:    /72 (BP Location: Left arm, Patient Position: Sitting, BP Cuff Size: Adult)   Pulse 83   Temp 36 °C (96.8 °F) (Temporal)   Resp 16   Ht 1.626 m (5' 4\")   Wt 71.9 kg (158 lb 9.6 oz)   SpO2 98%  Body mass index is 27.22 kg/m².    General:  Well nourished, well developed female in NAD  HENT: Atraumatic, normocephalic  EYES: Extraocular movements intact, pupils equal and reactive to light  NECK: Supple, FROM  CHEST: No deformities, Equal chest expansion  RESP: Unlabored, no stridor or audible wheeze  ABD: Non-Distended  Extremities: No Clubbing, Cyanosis, or Edema  Skin: Warm/dry, without rashes  Neuro: A/O x 4, CN 2-12 Grossly intact, " Motor/sensory grossly intact  Psych: Normal behavior, normal affect      LABS: Results reviewed and discussed with the patient, questions answered.        Return in about 3 weeks (around 2/6/2024).    My total time spent caring for the patient on the day of the encounter was 30 minutes.   This does not include time spent on separately billable procedures/tests.

## 2024-01-17 NOTE — ASSESSMENT & PLAN NOTE
"Adriana is here to discuss ongoing depression and caregiver stress. She states that she realizes she was \"in a bad way\". After our appointment last week she reached out to friends and family to reveal how depressed and suicidal she was. This had the effect of bringing her family closer to her, with her daughter now checking in with her every day, friends calling, and two sisters who live nearby came to her immediately bringing comfort items. This has been helpful. She brought in her clonazepam bottle to show me that she is adherent to the dosing and understands the concern I have with this medication. No evidence of misuse since last week according to pill counts. We discussed a slow taper of this medication over the next few months but she was concerned about re-emergence of her restless legs, which is why she was started on the clonazepam in the first place by another provider.  We reviewed the issue of tolerance to this medication and will continue to revisit the idea of tapering off very gently. Currently she takes one a day and does skip a day sometimes. Sleep is very disrupted right now. Her  sleeps a lot, and goes to bed very early; he wants her to go to bed as well with him, but she then finds she's awake half the night and ends up napping during the day. She asked about any medication that might help with insomnia, and we discussed that there is no medication that would be suitable for her given her opiate dependence and chronic BZD use.     She has a follow up appointment with Dr. Carlisle next Tuesday and I will see her in mid February.   "

## 2024-01-19 ENCOUNTER — TELEPHONE (OUTPATIENT)
Dept: HEALTH INFORMATION MANAGEMENT | Facility: OTHER | Age: 74
End: 2024-01-19
Payer: MEDICARE

## 2024-01-23 ENCOUNTER — OFFICE VISIT (OUTPATIENT)
Dept: MEDICAL GROUP | Facility: CLINIC | Age: 74
End: 2024-01-23
Payer: MEDICARE

## 2024-01-23 DIAGNOSIS — F32.1 CURRENT MODERATE EPISODE OF MAJOR DEPRESSIVE DISORDER WITHOUT PRIOR EPISODE (HCC): ICD-10-CM

## 2024-01-23 PROCEDURE — 90834 PSYTX W PT 45 MINUTES: CPT | Performed by: PSYCHOLOGIST

## 2024-01-23 NOTE — PSYCHOTHERAPY
Struggling, working on controlling what she can in life.  Signing up for a [employer] class, wants to take a course at [facility] this summer.  Interested in home health support, connected her with our social work intern.      Stable, zero plans to harm self due to commitments to her animals and friends.    See in one week.

## 2024-01-23 NOTE — PROGRESS NOTES
Full therapy note has been documented and is under restricted viewing.  Please see below for summary of today's session.      Patient Name: Felicia Schaefer  Patient MRN: [id number]  Today's Date:  1/23/24     Type of session: individual psychotherapy  Session start time: 10  Session stop time: 10:45  Length of time spent face to face with patient: 45  Persons in attendance: patient     Diagnoses:   1. Depression          Symptoms currently being addressed in therapy: depression     Therapeutic Intervention(s): CBT. Support, problem solving     Treatment Goal(s)/Objective(s) addressed: Asking for help, being clear with her needs, setting boundaries     Progress toward Treatment Goals: improved   Plan:      - Continue   therapy  in one week.  Meet with  today to problem solve about in-house support.    Oanh Carlisle, Ph.D.                                             Struggling, working on controlling what she can in life.  Signing up for a [employer] class, wants to take a course at [employer] this summer.  Interested in home health support, connected her with our social work intern.      Stable, zero plans to harm self due to commitments to her animals and friends.    See in one week.

## 2024-01-29 ENCOUNTER — APPOINTMENT (OUTPATIENT)
Dept: RADIOLOGY | Facility: MEDICAL CENTER | Age: 74
End: 2024-01-29
Attending: FAMILY MEDICINE
Payer: MEDICARE

## 2024-01-30 ENCOUNTER — OFFICE VISIT (OUTPATIENT)
Dept: MEDICAL GROUP | Facility: CLINIC | Age: 74
End: 2024-01-30
Payer: MEDICARE

## 2024-01-30 DIAGNOSIS — F06.4 ANXIETY DISORDER DUE TO MEDICAL CONDITION: ICD-10-CM

## 2024-01-30 PROCEDURE — 90834 PSYTX W PT 45 MINUTES: CPT | Performed by: PSYCHOLOGIST

## 2024-01-30 NOTE — PROGRESS NOTES
Full therapy note has been documented and is under restricted viewing.  Please see below for summary of today's session.      Patient Name: Felicia Schaefer  Patient MRN: [id number]  Today's Date:  1/30/24     Type of session: individual psychotherapy  Session start time: 11  Session stop time: 11:45  Length of time spent face to face with patient: 45  Persons in attendance: patient     Diagnoses:   1. Depression          Symptoms currently being addressed in therapy: depression     Therapeutic Intervention(s): CBT. Support, problem solving     Treatment Goal(s)/Objective(s) addressed: Asking for help, being clear with her needs, setting boundaries.     Progress toward Treatment Goals: improved   Plan:      - Continue   therapy  in two weeks.     Oanh Carlisle, Ph.D.

## 2024-01-30 NOTE — PSYCHOTHERAPY
Doing well, positive about life and social work interaction.  Working on saying yes to help.    Stable    See in two weeks.

## 2024-02-07 ENCOUNTER — TELEPHONE (OUTPATIENT)
Dept: HEALTH INFORMATION MANAGEMENT | Facility: OTHER | Age: 74
End: 2024-02-07
Payer: MEDICARE

## 2024-02-13 ENCOUNTER — OFFICE VISIT (OUTPATIENT)
Dept: MEDICAL GROUP | Facility: CLINIC | Age: 74
End: 2024-02-13
Payer: MEDICARE

## 2024-02-13 DIAGNOSIS — F06.4 ANXIETY DISORDER DUE TO MEDICAL CONDITION: ICD-10-CM

## 2024-02-13 PROCEDURE — 90837 PSYTX W PT 60 MINUTES: CPT | Performed by: PSYCHOLOGIST

## 2024-02-13 NOTE — PROGRESS NOTES
Full therapy note has been documented and is under restricted viewing.  Please see below for summary of today's session.      Patient Name: Felicia Schaefer  Patient MRN: [id number]  Today's Date:  2/13/24     Type of session: individual psychotherapy  Session start time: 11  Session stop time: 11:45  Length of time spent face to face with patient: 45  Persons in attendance: patient     Diagnoses:   1. Depression          Symptoms currently being addressed in therapy: depression     Therapeutic Intervention(s): CBT. Support, problem solving     Treatment Goal(s)/Objective(s) addressed: Asking for help, being clear with her needs, setting boundaries.     Progress toward Treatment Goals: improved   Plan:      - Continue   therapy  in two weeks.     Oanh Carlisle, Ph.D.

## 2024-02-13 NOTE — PSYCHOTHERAPY
Doing well, frustrated with .  Appreciating help from friends.  Looking forward to starting a [employer] class, and is inviting a friend to do it with her.    Stable    See in two weeks.

## 2024-02-15 ENCOUNTER — PATIENT MESSAGE (OUTPATIENT)
Dept: MEDICAL GROUP | Facility: CLINIC | Age: 74
End: 2024-02-15

## 2024-02-15 ENCOUNTER — APPOINTMENT (OUTPATIENT)
Dept: MEDICAL GROUP | Facility: CLINIC | Age: 74
End: 2024-02-15
Payer: MEDICARE

## 2024-02-15 RX ORDER — CLONAZEPAM 1 MG/1
TABLET ORAL
Qty: 45 TABLET | OUTPATIENT
Start: 2024-02-15

## 2024-02-15 RX ORDER — CLONAZEPAM 1 MG/1
TABLET ORAL
COMMUNITY
Start: 2024-02-10

## 2024-02-15 NOTE — TELEPHONE ENCOUNTER
Phone Number Called: [phone]      Call outcome: Spoke to patient regarding message below.    Message: Called pt to r/s appt for 2/15. She is requesting to up the quantity of her Clonazepam from 30 tablets a month to 45 tablets per month. She states she is has been struggling with anxiety as her  has dementia and has been worsening. She intended to address this at her office visit today but is now r/s for March. Patient is aware that there is no guarantee because this is a controlled substance. Please advise.

## 2024-03-01 ENCOUNTER — APPOINTMENT (OUTPATIENT)
Dept: BEHAVIORAL HEALTH | Facility: PSYCHIATRIC FACILITY | Age: 74
End: 2024-03-01
Payer: MEDICARE

## 2024-03-04 ENCOUNTER — APPOINTMENT (OUTPATIENT)
Dept: MEDICAL GROUP | Facility: CLINIC | Age: 74
End: 2024-03-04
Payer: MEDICARE

## 2024-03-06 ENCOUNTER — APPOINTMENT (OUTPATIENT)
Dept: MEDICAL GROUP | Facility: CLINIC | Age: 74
End: 2024-03-06
Payer: MEDICARE

## 2024-03-13 DIAGNOSIS — E11.42 TYPE 2 DIABETES MELLITUS WITH DIABETIC POLYNEUROPATHY, WITHOUT LONG-TERM CURRENT USE OF INSULIN (HCC): ICD-10-CM

## 2024-03-13 RX ORDER — LIRAGLUTIDE 6 MG/ML
1.8 INJECTION SUBCUTANEOUS DAILY
Qty: 6 ML | Refills: 4 | Status: SHIPPED | OUTPATIENT
Start: 2024-03-13

## 2024-03-19 ENCOUNTER — TELEPHONE (OUTPATIENT)
Dept: HEALTH INFORMATION MANAGEMENT | Facility: OTHER | Age: 74
End: 2024-03-19
Payer: MEDICARE

## 2024-03-20 ENCOUNTER — OFFICE VISIT (OUTPATIENT)
Dept: MEDICAL GROUP | Facility: CLINIC | Age: 74
End: 2024-03-20
Payer: MEDICARE

## 2024-03-20 VITALS
HEART RATE: 95 BPM | HEIGHT: 64 IN | TEMPERATURE: 97 F | BODY MASS INDEX: 25.78 KG/M2 | DIASTOLIC BLOOD PRESSURE: 84 MMHG | OXYGEN SATURATION: 94 % | SYSTOLIC BLOOD PRESSURE: 152 MMHG | WEIGHT: 151 LBS

## 2024-03-20 DIAGNOSIS — F13.20 ANXIOLYTIC DEPENDENCE WITH CURRENT USE (HCC): ICD-10-CM

## 2024-03-20 DIAGNOSIS — E11.42 TYPE 2 DIABETES MELLITUS WITH DIABETIC POLYNEUROPATHY, WITHOUT LONG-TERM CURRENT USE OF INSULIN (HCC): ICD-10-CM

## 2024-03-20 DIAGNOSIS — I10 ESSENTIAL HYPERTENSION, BENIGN: ICD-10-CM

## 2024-03-20 DIAGNOSIS — Z63.6 CAREGIVER STRESS: ICD-10-CM

## 2024-03-20 PROCEDURE — 3077F SYST BP >= 140 MM HG: CPT | Performed by: FAMILY MEDICINE

## 2024-03-20 PROCEDURE — 3079F DIAST BP 80-89 MM HG: CPT | Performed by: FAMILY MEDICINE

## 2024-03-20 PROCEDURE — 99214 OFFICE O/P EST MOD 30 MIN: CPT | Performed by: FAMILY MEDICINE

## 2024-03-20 RX ORDER — SEMAGLUTIDE 1.34 MG/ML
1 INJECTION, SOLUTION SUBCUTANEOUS
COMMUNITY
End: 2024-03-20

## 2024-03-20 SDOH — SOCIAL STABILITY - SOCIAL INSECURITY: DEPENDENT RELATIVE NEEDING CARE AT HOME: Z63.6

## 2024-03-20 ASSESSMENT — PATIENT HEALTH QUESTIONNAIRE - PHQ9
2. FEELING DOWN, DEPRESSED, IRRITABLE, OR HOPELESS: SEVERAL DAYS
8. MOVING OR SPEAKING SO SLOWLY THAT OTHER PEOPLE COULD HAVE NOTICED. OR THE OPPOSITE, BEING SO FIGETY OR RESTLESS THAT YOU HAVE BEEN MOVING AROUND A LOT MORE THAN USUAL: SEVERAL DAYS
4. FEELING TIRED OR HAVING LITTLE ENERGY: NEARLY EVERY DAY
9. THOUGHTS THAT YOU WOULD BE BETTER OFF DEAD, OR OF HURTING YOURSELF: NOT AT ALL
7. TROUBLE CONCENTRATING ON THINGS, SUCH AS READING THE NEWSPAPER OR WATCHING TELEVISION: MORE THAN HALF THE DAYS
1. LITTLE INTEREST OR PLEASURE IN DOING THINGS: SEVERAL DAYS
5. POOR APPETITE OR OVEREATING: NOT AT ALL
6. FEELING BAD ABOUT YOURSELF - OR THAT YOU ARE A FAILURE OR HAVE LET YOURSELF OR YOUR FAMILY DOWN: NOT AL ALL
3. TROUBLE FALLING OR STAYING ASLEEP OR SLEEPING TOO MUCH: NEARLY EVERY DAY
SUM OF ALL RESPONSES TO PHQ QUESTIONS 1-9: 11
SUM OF ALL RESPONSES TO PHQ9 QUESTIONS 1 AND 2: 2

## 2024-03-20 ASSESSMENT — FIBROSIS 4 INDEX: FIB4 SCORE: 1.13

## 2024-03-21 PROBLEM — E66.3 OVERWEIGHT WITH BODY MASS INDEX (BMI) OF 25 TO 25.9 IN ADULT: Status: ACTIVE | Noted: 2022-08-23

## 2024-03-21 NOTE — ASSESSMENT & PLAN NOTE
Pt asked about increasing dose which I declined due to elevated risk related to concurrent use of opiates, age, and fall potential.

## 2024-03-21 NOTE — PROGRESS NOTES
CC:Diagnoses of Type 2 diabetes mellitus with diabetic polyneuropathy, without long-term current use of insulin (HCC), Essential hypertension, benign, Caregiver stress, and Anxiolytic dependence with current use (HCC) were pertinent to this visit.      HISTORY OF PRESENT ILLNESS: Patient is a 73 y.o. 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     Essential hypertension, benign  Home blood pressures are 120s/80s. Today a bit high in office but with home blood pressures being in the normal range we will continue to monitor at home. No change in management.     Overweight with body mass index (BMI) of 25 to 25.9 in adult  Chronic, improving. Adriana has lost more weight with victoza and is happy with this. Now in overweight category as opposed to obese.     Caregiver stress  She continues to follow closely with Dr. Carilsle who has been a huge help and support. Doing better especially with improving weather.    Anxiolytic dependence with current use ([facility])  Pt asked about increasing dose which I declined due to elevated risk related to concurrent use of opiates, age, and fall potential.     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) 02/08/2022    Acquired hypothyroidism 10/18/2021    Anxiety disorder due to medical condition 10/18/2021    Anxiolytic dependence with current use (HCC) 10/18/2021    Combined hyperlipidemia associated with type 2 diabetes mellitus (HCC) 10/18/2021    Diabetic polyneuropathy associated with  type 2 diabetes mellitus (HCC) 10/18/2021    Endogenous depression (HCC) 10/18/2021    Essential hypertension, benign 10/18/2021    Gastroesophageal reflux disease 10/18/2021    Long term (current) use of opiate analgesic 10/18/2021    Mixed hyperlipidemia 10/18/2021    Other chronic pain 10/18/2021    Primary localized osteoarthrosis of ankle and foot 12/18/2019        Allergies:Ondansetron, Penicillins, Tramadol, Apixaban, Diprivan, Lyrica, Metoclopramide, Paroxetine, Pregabalin, Rivaroxaban, and Warfarin    Current Outpatient Medications   Medication Sig Dispense Refill    glucose blood (CONTOUR NEXT TEST) strip 1 Strip by Other route in the morning, at noon, and at bedtime. 300 Strip 3    liraglutide (VICTOZA) 18 MG/3ML Solution Pen-injector Inject 1.8 mg under the skin every day. 6 mL 4    clonazePAM (KLONOPIN) 1 MG Tab       losartan (COZAAR) 100 MG Tab TAKE 1 TABLET BY MOUTH EVERY  Tablet 3    NOVOFINE PEN NEEDLE 32G X 6 MM Misc USE 1 PEN NEEDLE DAILY 100 Each 3    pravastatin (PRAVACHOL) 20 MG Tab TAKE 1 TABLET BY MOUTH EVERY EVENING 90 Tablet 3    levothyroxine (SYNTHROID) 50 MCG Tab TAKE 1 TABLET BY MOUTH EVERY  Tablet 3    Aug Betamethasone Dipropionate (DIPROLENE-AF) 0.05 % Cream Apply 1 Application. topically.      aspirin (ASA) 81 MG Chew Tab chewable tablet Chew 81 mg every day.      Lancets 30G Misc 1 Each in the morning, at noon, and at bedtime. 100 Each 11    cyclobenzaprine (FLEXERIL) 10 mg Tab Take 10 mg by mouth 2 times a day as needed.      ibuprofen (MOTRIN) 800 MG Tab Take 800 mg by mouth every 8 hours as needed.      levalbuterol (XOPENEX HFA) 45 MCG/ACT inhaler levalbuterol HFA 45 mcg/actuation aerosol inhaler      oxyCODONE-acetaminophen (PERCOCET-10)  MG Tab Take  Tablets by mouth.       No current facility-administered medications for this visit.       Social History     Tobacco Use    Smoking status: Never    Smokeless tobacco: Never   Vaping Use    Vaping Use:  "Former   Substance Use Topics    Alcohol use: Not Currently    Drug use: Not Currently     Social History     Social History Narrative    Not on file       No family history on file.    Exam:    BP (!) 152/84 (BP Location: Left arm, Patient Position: Sitting, BP Cuff Size: Adult)   Pulse 95   Temp 36.1 °C (97 °F) (Temporal)   Ht 1.626 m (5' 4\")   Wt 68.5 kg (151 lb)   SpO2 94%  Body mass index is 25.92 kg/m².    General:  Well nourished, well developed female in NAD  HENT: Atraumatic, normocephalic  EYES: Extraocular movements intact, pupils equal and reactive to light  NECK: Supple, FROM  CHEST: No deformities, Equal chest expansion. Kyphosis.  RESP: Unlabored, no stridor or audible wheeze  ABD: Non-Distended  Extremities: No Clubbing, Cyanosis, or Edema  Skin: Warm/dry, without rashes  Neuro: A/O x 4, CN 2-12 Grossly intact, Motor/sensory grossly intact  Psych: Normal behavior, normal affect      LABS: Results reviewed and discussed with the patient, questions answered.        Return in about 3 months (around 6/20/2024).    My total time spent caring for the patient on the day of the encounter was 30 minutes.   This does not include time spent on separately billable procedures/tests.     "

## 2024-03-21 NOTE — ASSESSMENT & PLAN NOTE
Chronic, improving. Adriana has lost more weight with victoza and is happy with this. Now in overweight category as opposed to obese.

## 2024-03-21 NOTE — ASSESSMENT & PLAN NOTE
Home blood pressures are 120s/80s. Today a bit high in office but with home blood pressures being in the normal range we will continue to monitor at home. No change in management.

## 2024-03-21 NOTE — ASSESSMENT & PLAN NOTE
She continues to follow closely with Dr. Carlisle who has been a huge help and support. Doing better especially with improving weather.

## 2024-03-26 ENCOUNTER — APPOINTMENT (OUTPATIENT)
Dept: MEDICAL GROUP | Facility: CLINIC | Age: 74
End: 2024-03-26
Payer: MEDICARE

## 2024-04-02 ENCOUNTER — OFFICE VISIT (OUTPATIENT)
Dept: MEDICAL GROUP | Facility: CLINIC | Age: 74
End: 2024-04-02
Payer: MEDICARE

## 2024-04-02 DIAGNOSIS — F06.4 ANXIETY DISORDER DUE TO MEDICAL CONDITION: ICD-10-CM

## 2024-04-02 PROCEDURE — 90834 PSYTX W PT 45 MINUTES: CPT | Performed by: PSYCHOLOGIST

## 2024-04-02 NOTE — PROGRESS NOTES
Full therapy note has been documented and is under restricted viewing.  Please see below for summary of today's session.      Patient Name: Felicia Schaefer  Patient MRN: [id number]  Today's Date:  4/2/24     Type of session: individual psychotherapy  Session start time: 11  Session stop time: 11:45  Length of time spent face to face with patient: 45  Persons in attendance: patient     Diagnoses:   1. Depression          Symptoms currently being addressed in therapy: depression     Therapeutic Intervention(s): CBT. Support, problem solving     Treatment Goal(s)/Objective(s) addressed: Asking for help, being clear with her needs, setting boundaries.     Progress toward Treatment Goals: improved   Plan:      - Continue   therapy  in two weeks.     Oanh Carlisle, Ph.D.

## 2024-04-16 ENCOUNTER — OFFICE VISIT (OUTPATIENT)
Dept: MEDICAL GROUP | Facility: CLINIC | Age: 74
End: 2024-04-16
Payer: MEDICARE

## 2024-04-16 DIAGNOSIS — F32.89 OTHER DEPRESSION: ICD-10-CM

## 2024-04-16 PROCEDURE — 90834 PSYTX W PT 45 MINUTES: CPT | Performed by: PSYCHOLOGIST

## 2024-04-16 NOTE — PROGRESS NOTES
Full therapy note has been documented and is under restricted viewing.  Please see below for summary of today's session.      Patient Name: Felicia Schaefer  Patient MRN: [id number]  Today's Date:  4/16/24     Type of session: individual psychotherapy  Session start time: 11  Session stop time: 11:45  Length of time spent face to face with patient: 45  Persons in attendance: patient     Diagnoses:   1. depression/caregiver stress         Symptoms currently being addressed in therapy: depression     Therapeutic Intervention(s): CBT. Support, problem solving     Treatment Goal(s)/Objective(s) addressed: Asking for help, being clear with her needs, setting boundaries.     Progress toward Treatment Goals: improved   Plan:      - Continue   therapy  in two weeks.     Oanh Carlisle, Ph.D.

## 2024-04-16 NOTE — PSYCHOTHERAPY
Reflects on an evening at a party with her , had a wonderful time.  Open to discussing conflicting feelings about her  and son.    Stable    See in two weeks.

## 2024-04-22 DIAGNOSIS — E78.2 MIXED HYPERLIPIDEMIA: ICD-10-CM

## 2024-04-22 DIAGNOSIS — E11.42 TYPE 2 DIABETES MELLITUS WITH DIABETIC POLYNEUROPATHY, WITHOUT LONG-TERM CURRENT USE OF INSULIN (HCC): ICD-10-CM

## 2024-04-22 DIAGNOSIS — F13.20 ANXIOLYTIC DEPENDENCE WITH CURRENT USE (HCC): ICD-10-CM

## 2024-04-22 DIAGNOSIS — F06.4 ANXIETY DISORDER DUE TO MEDICAL CONDITION: ICD-10-CM

## 2024-04-22 RX ORDER — PRAVASTATIN SODIUM 20 MG
20 TABLET ORAL NIGHTLY
Qty: 100 TABLET | Refills: 3 | Status: SHIPPED | OUTPATIENT
Start: 2024-04-22

## 2024-04-22 RX ORDER — CLONAZEPAM 1 MG/1
1 TABLET ORAL 3 TIMES DAILY PRN
Qty: 30 TABLET | Refills: 2 | Status: SHIPPED | OUTPATIENT
Start: 2024-04-22 | End: 2024-05-02

## 2024-04-22 NOTE — TELEPHONE ENCOUNTER
Received request via: Pharmacy    Was the patient seen in the last year in this department? Yes    Does the patient have an active prescription (recently filled or refills available) for medication(s) requested? No    Pharmacy Name: [facility]    Does the patient have [facility] Plus and need 100 day supply (blood pressure, diabetes and cholesterol meds only)? Yes, quantity updated to 100 days

## 2024-04-30 ENCOUNTER — OFFICE VISIT (OUTPATIENT)
Dept: MEDICAL GROUP | Facility: CLINIC | Age: 74
End: 2024-04-30
Payer: MEDICARE

## 2024-04-30 DIAGNOSIS — F32.89 OTHER DEPRESSION: ICD-10-CM

## 2024-04-30 NOTE — PROGRESS NOTES
Full therapy note has been documented and is under restricted viewing.  Please see below for summary of today's session.      Patient Name: Felicia Schaefer  Patient MRN: [id number]  Today's Date:  4/30/24     Type of session: individual psychotherapy  Session start time: 10  Session stop time: 10:45  Length of time spent face to face with patient: 45  Persons in attendance: patient     Diagnoses:   1. depression/caregiver stress         Symptoms currently being addressed in therapy: depression     Therapeutic Intervention(s): CBT. Support, problem solving     Treatment Goal(s)/Objective(s) addressed: Asking for help, being clear with her needs, setting boundaries.     Progress toward Treatment Goals: improved   Plan:      - Continue   therapy  in two weeks.     Oanh Carlisle, Ph.D.

## 2024-04-30 NOTE — PSYCHOTHERAPY
Focused on caregiver stress, feels better with less benzo, wanting to make realistic plans for now and for the future.    Stable    See in two weeks.

## 2024-05-14 ENCOUNTER — OFFICE VISIT (OUTPATIENT)
Dept: NEUROLOGY | Facility: MEDICAL CENTER | Age: 74
End: 2024-05-14
Attending: PSYCHIATRY & NEUROLOGY
Payer: MEDICARE

## 2024-05-14 VITALS
OXYGEN SATURATION: 97 % | BODY MASS INDEX: 23.48 KG/M2 | RESPIRATION RATE: 16 BRPM | HEART RATE: 98 BPM | SYSTOLIC BLOOD PRESSURE: 122 MMHG | DIASTOLIC BLOOD PRESSURE: 80 MMHG | TEMPERATURE: 98.4 F | HEIGHT: 67 IN | WEIGHT: 149.6 LBS

## 2024-05-14 DIAGNOSIS — R27.8 SENSORY ATAXIA: ICD-10-CM

## 2024-05-14 DIAGNOSIS — R20.2 PARESTHESIAS: Primary | ICD-10-CM

## 2024-05-14 DIAGNOSIS — R25.2 SPASMS OF THE HANDS OR FEET: ICD-10-CM

## 2024-05-14 DIAGNOSIS — M79.10 MYALGIA: ICD-10-CM

## 2024-05-14 PROCEDURE — 3079F DIAST BP 80-89 MM HG: CPT | Performed by: PSYCHIATRY & NEUROLOGY

## 2024-05-14 PROCEDURE — 3074F SYST BP LT 130 MM HG: CPT | Performed by: PSYCHIATRY & NEUROLOGY

## 2024-05-14 PROCEDURE — 99205 OFFICE O/P NEW HI 60 MIN: CPT | Performed by: PSYCHIATRY & NEUROLOGY

## 2024-05-14 ASSESSMENT — ENCOUNTER SYMPTOMS
HEADACHES: 0
NERVOUS/ANXIOUS: 1
FALLS: 0
SENSORY CHANGE: 0
TINGLING: 1
LOSS OF CONSCIOUSNESS: 0
MEMORY LOSS: 1
MYALGIAS: 1
PALPITATIONS: 1
BACK PAIN: 1
BLURRED VISION: 1
DEPRESSION: 1
TREMORS: 0
CONSTIPATION: 1
INSOMNIA: 1
WEIGHT LOSS: 1

## 2024-05-14 ASSESSMENT — FIBROSIS 4 INDEX: FIB4 SCORE: 1.13

## 2024-05-14 NOTE — PROGRESS NOTES
Vincent Schaefer is a 73 y.o. female who presents from the office of Dr. Shirley Keith MD, for consultation, with a history of worsening symptoms including fatigue, cognitive impairment, myalgias and paresthesias, blurry vision, muscle cramps and spasms, sleep disturbances, gait ataxia and significant anxiety.     CHASE Duarte is a very pleasant 73-year-old right-handed woman who states that she began suffering from several of the symptoms estimating 10 years ago, which have continued to worsen, although did come to ahead especially in the last months.  There was no singular or inciting event that started everything.    She describes the fatigue as being overwhelming.  It has been a chronic issue for her, but severely impairs her ability to do much of anything physically.  It also has a negative effect on her multitude of other complaints.    Cognitively, she describes significant word finding difficulties, memory loss especially for recent events, and also information processing.  Multitasking has become more difficult for her.  She feels much more easily overwhelmed.  She often finds her self asking others to repeat themselves.  Still, though she has to write things down, she does make appointments, takes care of her own medications, is actually involved in her 's full cares (he has severe dementia), as well as taking care of their house and farm (they have a few horses), etc.    She describes diffuse myalgias as well as arthralgias along with tingling sensations.  These are multifocal, can involve any or all of the extremities spontaneously.  There is no analgesia or burning dysesthesias.  With myalgias pain, weakness, she also describes intermittent muscle spasms in both the hands and feet, right slightly more involved than the left.  There is no tremor.    She describes a temperature change in the feet, they feel very cold and amber in cold weather, this occurs about the rest of  her is hot and diaphoretic.  Appetite and weight have changed slightly, though she denies early satiety with nausea vomiting when she does eat.  There has been notable changes with bladder or bowel control, mostly urinary retention and constipation.  She has not been incontinent.  Her glycemic control is well-maintained, she has lost 60 pounds, but by design, over the last matter of months.    Sleep is distorted, she describes difficulty not just falling asleep and maintaining sleep.  He has never had issues suggestive of RBD sleep dysfunction.    Her balance is curtailed, this has been a longstanding issue for her.  She notices this especially if it is dark or if she walks uneven surfaces.  It helps to look at the ground on an occasion.  Fortunately she is not following with any kind of regularity.    The anxiety and mood changes have been most overwhelming.  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  She acknowledges depression and feeling overwhelmed emotionally.  She finds that using Klonopin helps attenuate the severity of all of the symptoms including the vegetative symptoms described above.  She denies any types of suicidal thoughts or ideations, she is not planning to hurt herself or others, but she has also asked for additional doses of Klonopin to be used to get her through tough times.    In addition to the above, she has found a good psychiatrist, she does take Klonopin 1 mg twice a day, and she has got herself into some cognitive therapies to keep her mind sharp.  Being treated for chronic pain, MRI of the brain was done at an outlying facility and was evidently unremarkable.  MRI of the lumbar spine also was done as part of her present workup, revealing degenerative disc changes only, nothing critical that would warrant  "immediate surgical intervention.    She has a medical history of type 2 diabetes, DJD, hypothyroidism, hypertension, major depressive disorder with anxiety and dyslipidemia.  There is no history of CAD, CVA, CVA, seizure, MS, diagnosed neurodegenerative disease, blood dyscrasia, IVON or other pulmonary disease, or autoimmune disease.    There is no surgical history of note from my standpoint.    Both of her parents were alcoholics, her father  from complications of cancer.  Her mother  at 96 years old.  Her sister as far she knows is alive and well.    She neither smokes or drinks, is a retired pharmacist.    She is on Pravachol, Victoza, baby aspirin, Klonopin 1 mg twice daily, Motrin, Xopenex, Synthroid, Cozaar and has Percocet as needed.    Review of Systems   Constitutional:  Positive for malaise/fatigue and weight loss.   Eyes:  Positive for blurred vision.   Cardiovascular:  Positive for palpitations.   Gastrointestinal:  Positive for constipation.   Genitourinary:  Positive for urgency.   Musculoskeletal:  Positive for back pain and myalgias. Negative for falls.   Neurological:  Positive for tingling. Negative for tremors, sensory change, loss of consciousness and headaches.   Psychiatric/Behavioral:  Positive for depression and memory loss. Negative for suicidal ideas. The patient is nervous/anxious and has insomnia.    All other systems reviewed and are negative.    Objective     /80 (BP Location: Right arm, Patient Position: Sitting, BP Cuff Size: Adult)   Pulse 98   Temp 36.9 °C (98.4 °F) (Temporal)   Resp 16   Ht 1.702 m (5' 7\")   Wt 67.9 kg (149 lb 9.6 oz)   SpO2 97%   BMI 23.43 kg/m²      Physical Exam    She appears in no acute distress.  She is very pleasant in spirit and demeanor, clean and appropriately dressed, she is quite cooperative.  Vital signs are stable.  There is no malar rash or jaw claudication.  The neck is supple, range of motion is full, carotid pulses are present " "bilaterally without asymmetry.  Cardiac evaluation reveals a regular rhythm.     Neurological Exam    She is fully oriented, there is no aphasia, agnosia, apraxia, or inattention. She talks about her 's illness, she does become quite emotional, she cries quite easily.  Still, she can follow multistep commands without issue, attention is quite good.  She spells the word \"world\" forwards and backwards without issue.  Retention of 3 words immediately and then at a 5 and 20-minute interval is actually quite good.  Similarities were performed easily.  Serial-7 subtractions are done correctly.  Fund of knowledge is quite good.    PERRLA/EOMI, funduscopic exam reveals sharp disc margins, visual fields are full to finger counting confrontation bilaterally.  Facial movements are symmetric, sensory exam is intact to temperature, light touch and pinprick bilaterally.  The tongue and uvula are midline, jaw movements are intact, shoulder shrug and head rotation are normal.    Musculoskeletal exam reveals normal tone throughout, there is no tremor, asterixis, or drift.  Strength is 5/5 throughout.  Reflexes are present at all points without asymmetries, including the ankles, the toes are downgoing bilaterally.    She stands slowly because of her back pain but can walk easily with normal station, though she does look at the ground on occasion.  Heel and toe walking are unremarkable.  Tandem walking is a little more difficult but she gets there.  There is no appendicular dystaxia.  Repetitive movements are symmetric, amplitude and frequencies intact throughout.    Sensory exam reveals a stocking pattern loss of vibration below the ankles, mild hyperpathia of pinprick in the toes, temperature curtail below the midshin.  Romberg is absent.    Assessment & Plan     1. Paresthesias, Myalgia  I think all of the symptoms she is describing have less to do with her primary neurologic disorder, rather more manifestation of severe " anxiety and depression making complaints consistent with chronic fatigue and fibromyalgia more prominent.  Her cognitive symptoms also can be explained on the basis of significant anxiety and depression.  Even though I could not view the MRI scan results, patients with MS do not have normal MRI scans, and I did not receive a frantic call from a troubled pain specialist and neurosurgeon about abnormalities on her MRI scan; thus I can fairly assume it was unremarkable.  No primary neurologic disease can claim responsibility for this constellation of symptoms that she has.  I encouraged her to follow through with her therapist as well as  in regards to medication and counseling.  I do not think I need to add any more in this regard.    2. Sensory ataxia, Spasms of the hands or feet  The balance difficulties and the spasms are likely related to a diabetic polyneuropathy which she clearly has signs of on exam though subjectively she has no symptoms from.  The sensory ataxia can be quite significant, and this could explain some of the symptoms that she does have.  Urinary retention and constipation issues I think are more to do with medication (i.e. narcotics) rather than a dysautonomia from her diabetes.    I spent some time reviewing all of the above with her and reassured her that she does not have neurologic disease, especially MS, a candidate she had become focused on given the constellation of symptoms and what she had read on Dr. Martins's website.    Time: 60 minutes in total spent in patient care including pre-charting, record review, discussion with healthcare staff and documentation.  This includes face-to-face time for exam, review, discussion, as well as counseling and coordinating care.

## 2024-05-17 ENCOUNTER — OFFICE VISIT (OUTPATIENT)
Dept: BEHAVIORAL HEALTH | Facility: PSYCHIATRIC FACILITY | Age: 74
End: 2024-05-17
Payer: MEDICARE

## 2024-05-17 DIAGNOSIS — F32.89 OTHER DEPRESSION: ICD-10-CM

## 2024-05-17 PROCEDURE — 90834 PSYTX W PT 45 MINUTES: CPT | Performed by: PSYCHOLOGIST

## 2024-05-17 NOTE — PROGRESS NOTES
Full therapy note has been documented and is under restricted viewing.  Please see below for summary of today's session.      Patient Name: Felicia Schaefer  Patient MRN: [id number]  Today's Date:  5/17/24     Type of session: individual psychotherapy  Session start time: 10  Session stop time: 10:45  Length of time spent face to face with patient: 45  Persons in attendance: patient     Diagnoses:   1. depression/caregiver stress         Symptoms currently being addressed in therapy: depression     Therapeutic Intervention(s): CBT. Support, problem solving     Treatment Goal(s)/Objective(s) addressed: Asking for help, being clear with her needs, setting boundaries.     Progress toward Treatment Goals: improved   Plan:      - Continue   therapy  in two weeks.     Oanh Carlisle, Ph.D.

## 2024-05-17 NOTE — PSYCHOTHERAPY
Delighted to not have MS.  Loved the neurology appointment.      Doing well.  Concerned about caregiving stress.    See in three weeks.    Refer to [facility] for care giving support.

## 2024-05-31 NOTE — TELEPHONE ENCOUNTER
Received request via: Pharmacy    Was the patient seen in the last year in this department? Yes    Does the patient have an active prescription (recently filled or refills available) for medication(s) requested? No    Pharmacy Name: [facility]    Does the patient have halfway Plus and need 100 day supply (blood pressure, diabetes and cholesterol meds only)? Patient does not have SCP

## 2024-06-03 RX ORDER — LEVOTHYROXINE SODIUM 0.05 MG/1
TABLET ORAL
Qty: 100 TABLET | Refills: 3 | Status: SHIPPED | OUTPATIENT
Start: 2024-06-03

## 2024-06-03 RX ORDER — BETAMETHASONE DIPROPIONATE 0.5 MG/G
CREAM TOPICAL 2 TIMES DAILY
Qty: 30 G | Refills: 2 | Status: SHIPPED | OUTPATIENT
Start: 2024-06-03

## 2024-06-03 NOTE — TELEPHONE ENCOUNTER
Received request via: Pharmacy    Was the patient seen in the last year in this department? Yes    Does the patient have an active prescription (recently filled or refills available) for medication(s) requested? No    Pharmacy Name: [id number] - [place], [place] - [address] AT [address]     Does the patient have care home Plus and need 100 day supply (blood pressure, diabetes and cholesterol meds only)? Patient does not have SCP

## 2024-06-07 ENCOUNTER — OFFICE VISIT (OUTPATIENT)
Dept: BEHAVIORAL HEALTH | Facility: PSYCHIATRIC FACILITY | Age: 74
End: 2024-06-07
Payer: MEDICARE

## 2024-06-07 DIAGNOSIS — F06.4 ANXIETY DISORDER DUE TO MEDICAL CONDITION: ICD-10-CM

## 2024-06-07 PROCEDURE — 90834 PSYTX W PT 45 MINUTES: CPT | Performed by: PSYCHOLOGIST

## 2024-06-07 NOTE — PSYCHOTHERAPY
Doing well, had a good visit with a dear friend.  Feeling well and hopeful about life.    Stable    See in two weeks.

## 2024-06-07 NOTE — PROGRESS NOTES
Full therapy note has been documented and is under restricted viewing.  Please see below for summary of today's session.      Patient Name: Felicia Schaefer  Patient MRN: [id number]  Today's Date:  6/7/24     Type of session: individual psychotherapy  Session start time: 3  Session stop time: 345  Length of time spent face to face with patient: 45  Persons in attendance: patient     Diagnoses:   1. depression/caregiver stress         Symptoms currently being addressed in therapy: depression     Therapeutic Intervention(s): CBT. Support, problem solving     Treatment Goal(s)/Objective(s) addressed: Asking for help, being clear with her needs, setting boundaries.     Progress toward Treatment Goals: improved   Plan:      - Continue   therapy  in two weeks.     Oanh Carlisle, Ph.D.

## 2024-06-21 ENCOUNTER — OFFICE VISIT (OUTPATIENT)
Dept: BEHAVIORAL HEALTH | Facility: PSYCHIATRIC FACILITY | Age: 74
End: 2024-06-21
Payer: MEDICARE

## 2024-06-21 DIAGNOSIS — F06.4 ANXIETY DISORDER DUE TO MEDICAL CONDITION: ICD-10-CM

## 2024-06-21 PROCEDURE — 90834 PSYTX W PT 45 MINUTES: CPT | Performed by: PSYCHOLOGIST

## 2024-06-21 NOTE — PROGRESS NOTES
Full therapy note has been documented and is under restricted viewing.  Please see below for summary of today's session.      Patient Name: Felicia Schaefer  Patient MRN: [id number]  Today's Date:  6/21/24     Type of session: individual psychotherapy  Session start time: 11  Session stop time: 11:30  Length of time spent face to face with patient: 30  Persons in attendance: patient     Diagnoses:   1. depression/caregiver stress         Symptoms currently being addressed in therapy: depression     Therapeutic Intervention(s): CBT. Support, problem solving     Treatment Goal(s)/Objective(s) addressed: Asking for help, being clear with her needs, setting boundaries.     Progress toward Treatment Goals: improved   Plan:      - Continue   therapy  in two weeks.     Oanh Carlisle, Ph.D.

## 2024-07-05 ENCOUNTER — OFFICE VISIT (OUTPATIENT)
Dept: BEHAVIORAL HEALTH | Facility: PSYCHIATRIC FACILITY | Age: 74
End: 2024-07-05
Payer: MEDICARE

## 2024-07-05 DIAGNOSIS — F06.4 ANXIETY DISORDER DUE TO MEDICAL CONDITION: ICD-10-CM

## 2024-07-05 PROCEDURE — 90834 PSYTX W PT 45 MINUTES: CPT | Performed by: PSYCHOLOGIST

## 2024-07-19 ENCOUNTER — OFFICE VISIT (OUTPATIENT)
Dept: BEHAVIORAL HEALTH | Facility: PSYCHIATRIC FACILITY | Age: 74
End: 2024-07-19
Payer: MEDICARE

## 2024-07-19 DIAGNOSIS — F32.89 OTHER DEPRESSION: ICD-10-CM

## 2024-07-19 DIAGNOSIS — F06.4 ANXIETY DISORDER DUE TO MEDICAL CONDITION: ICD-10-CM

## 2024-07-19 PROCEDURE — 90834 PSYTX W PT 45 MINUTES: CPT | Performed by: PSYCHOLOGIST

## 2024-07-26 DIAGNOSIS — F06.4 ANXIETY DISORDER DUE TO MEDICAL CONDITION: ICD-10-CM

## 2024-07-26 DIAGNOSIS — F13.20 ANXIOLYTIC DEPENDENCE WITH CURRENT USE (HCC): ICD-10-CM

## 2024-07-29 RX ORDER — CLONAZEPAM 1 MG/1
TABLET ORAL
Qty: 30 TABLET | Refills: 3 | Status: SHIPPED | OUTPATIENT
Start: 2024-07-29 | End: 2024-08-08

## 2024-07-31 NOTE — ASSESSMENT & PLAN NOTE
On low dose statin at tolerated level   Submitted PA for [facility]  [facility]US: PENDING.    Follow up done daily; if no decision with in three days we will refax.  If another three days goes by with no decision will call the insurance for status.

## 2024-08-02 ENCOUNTER — PATIENT MESSAGE (OUTPATIENT)
Dept: HEALTH INFORMATION MANAGEMENT | Facility: OTHER | Age: 74
End: 2024-08-02

## 2024-08-02 ENCOUNTER — APPOINTMENT (OUTPATIENT)
Dept: BEHAVIORAL HEALTH | Facility: PSYCHIATRIC FACILITY | Age: 74
End: 2024-08-02
Payer: MEDICARE

## 2024-08-02 ENCOUNTER — PATIENT OUTREACH (OUTPATIENT)
Dept: HEALTH INFORMATION MANAGEMENT | Facility: OTHER | Age: 74
End: 2024-08-02
Payer: MEDICARE

## 2024-08-02 NOTE — PROGRESS NOTES
I reached out to the patient via telephone in order to introduce the Personal Care Management program. Patient stated she is confident in managing her own conditions independently and is not interested in the program at this time. I offered to send a [employer] message with the department phone number at [phone] in case she has any questions or considers accepting the PCM services.

## 2024-08-09 ENCOUNTER — OFFICE VISIT (OUTPATIENT)
Dept: BEHAVIORAL HEALTH | Facility: PSYCHIATRIC FACILITY | Age: 74
End: 2024-08-09
Payer: MEDICARE

## 2024-08-09 DIAGNOSIS — F32.89 OTHER DEPRESSION: ICD-10-CM

## 2024-08-09 PROCEDURE — 90834 PSYTX W PT 45 MINUTES: CPT | Performed by: PSYCHOLOGIST

## 2024-08-09 NOTE — PSYCHOTHERAPY
Struggling with the heat and with pain.  Focused on problem solving and with finding peace in her life.  Loves her animals.    Stable    See in three weeks.

## 2024-09-05 ENCOUNTER — OFFICE VISIT (OUTPATIENT)
Dept: BEHAVIORAL HEALTH | Facility: PSYCHIATRIC FACILITY | Age: 74
End: 2024-09-05
Payer: MEDICARE

## 2024-09-05 DIAGNOSIS — F32.89 OTHER DEPRESSION: ICD-10-CM

## 2024-09-05 PROCEDURE — 90834 PSYTX W PT 45 MINUTES: CPT | Performed by: PSYCHOLOGIST

## 2024-09-05 NOTE — PSYCHOTHERAPY
Discusses grief and struggle with her aging/ill  and her own aging.      Doing well, caring for herself well.    See in one month.

## 2024-09-05 NOTE — PROGRESS NOTES
Full therapy note has been documented and is under restricted viewing.  Please see below for summary of today's session.      Patient Name: Felicia Schaefer  Patient MRN: [id number]  Today's Date:  9/5/24     Type of session: individual psychotherapy  Session start time: 11  Session stop time: 11:45  Length of time spent face to face with patient: 45  Persons in attendance: patient     Diagnoses:   1. depression/caregiver stress         Symptoms currently being addressed in therapy: depression     Therapeutic Intervention(s): CBT. Support, problem solving     Treatment Goal(s)/Objective(s) addressed: Asking for help, being clear with her needs, setting boundaries.     Progress toward Treatment Goals: improved   Plan:      - Continue   therapy  in one week    Oanh Carlisle, Ph.D.

## 2024-09-20 RX ORDER — LOSARTAN POTASSIUM 100 MG/1
100 TABLET ORAL DAILY
Qty: 100 TABLET | Refills: 3 | Status: SHIPPED | OUTPATIENT
Start: 2024-09-20

## 2024-09-20 NOTE — TELEPHONE ENCOUNTER
Received request via: Pharmacy    Was the patient seen in the last year in this department? Yes    Does the patient have an active prescription (recently filled or refills available) for medication(s) requested? No    [facility] Name[facility]    Does the patient have [facility] Plus and need 100-day supply? (This applies to ALL medications) Patient does not have SCP

## 2024-09-23 DIAGNOSIS — E11.42 TYPE 2 DIABETES MELLITUS WITH DIABETIC POLYNEUROPATHY, WITHOUT LONG-TERM CURRENT USE OF INSULIN (HCC): ICD-10-CM

## 2024-09-24 RX ORDER — LIRAGLUTIDE 6 MG/ML
INJECTION SUBCUTANEOUS
Qty: 18 ML | Refills: 3 | Status: SHIPPED | OUTPATIENT
Start: 2024-09-24

## 2024-09-24 NOTE — TELEPHONE ENCOUNTER
Received request via: Pharmacy    Was the patient seen in the last year in this department? Yes    Does the patient have an active prescription (recently filled or refills available) for medication(s) requested? No    [facility]    Does the patient have [facility] Plus and need 100-day supply? (This applies to ALL medications) Patient does not have SCP

## 2024-10-04 ENCOUNTER — OFFICE VISIT (OUTPATIENT)
Dept: BEHAVIORAL HEALTH | Facility: PSYCHIATRIC FACILITY | Age: 74
End: 2024-10-04
Payer: MEDICARE

## 2024-10-04 DIAGNOSIS — F32.89 OTHER DEPRESSION: ICD-10-CM

## 2024-10-04 PROCEDURE — 90834 PSYTX W PT 45 MINUTES: CPT | Performed by: PSYCHOLOGIST

## 2024-11-01 ENCOUNTER — OFFICE VISIT (OUTPATIENT)
Dept: BEHAVIORAL HEALTH | Facility: PSYCHIATRIC FACILITY | Age: 74
End: 2024-11-01
Payer: MEDICARE

## 2024-11-01 DIAGNOSIS — F06.4 ANXIETY DISORDER DUE TO MEDICAL CONDITION: ICD-10-CM

## 2024-11-01 PROCEDURE — 90834 PSYTX W PT 45 MINUTES: CPT | Performed by: PSYCHOLOGIST

## 2024-11-01 NOTE — PROGRESS NOTES
Full therapy note has been documented and is under restricted viewing.  Please see below for summary of today's session.      Patient Name: Felicia Schaefer  Patient MRN: [id number]  Today's Date:  11/1/24     Type of session: individual psychotherapy  Session start time: 1  Session stop time: 1:45  Length of time spent face to face with patient: 45  Persons in attendance: patient     Diagnoses:   1. depression/caregiver stress         Symptoms currently being addressed in therapy: depression     Therapeutic Intervention(s): CBT. Support, problem solving     Treatment Goal(s)/Objective(s) addressed: Asking for help, being clear with her needs, setting boundaries.  Doing well, feeling empowered and valued.     Progress toward Treatment Goals: improved   Plan:      - Continue   therapy  in one month  Oanh Carlisle, Ph.D.

## 2024-11-01 NOTE — PSYCHOTHERAPY
Reconnected with 's daughter, a very relieving and supportive visit.  Managing the stresses of caregiving and running her large property.    Stable    See in one month.

## 2024-11-21 DIAGNOSIS — F06.4 ANXIETY DISORDER DUE TO MEDICAL CONDITION: ICD-10-CM

## 2024-11-21 DIAGNOSIS — F13.20 ANXIOLYTIC DEPENDENCE WITH CURRENT USE (HCC): ICD-10-CM

## 2024-11-22 RX ORDER — CLONAZEPAM 1 MG/1
TABLET ORAL
Qty: 30 TABLET | Refills: 0 | Status: SHIPPED | OUTPATIENT
Start: 2024-11-22 | End: 2024-12-02

## 2024-11-22 NOTE — TELEPHONE ENCOUNTER
Received request via: Pharmacy    Was the patient seen in the last year in this department? Yes    Does the patient have an active prescription (recently filled or refills available) for medication(s) requested? No    Pharmacy Name: [facility]    Does the patient have [facility]PC Plus and need 100-day supply? (This applies to ALL medications) Patient does not have SCP

## 2024-12-18 NOTE — TELEPHONE ENCOUNTER
PRIOR AUTH FOR [facility] DOES NOT NEED A PRIOR AUTH PER [employer]   Received request via: Pharmacy    Was the patient seen in the last year in this department? Yes    Does the patient have an active prescription (recently filled or refills available) for medication(s) requested? No

## 2024-12-19 DIAGNOSIS — F06.4 ANXIETY DISORDER DUE TO MEDICAL CONDITION: ICD-10-CM

## 2024-12-19 DIAGNOSIS — F13.20 ANXIOLYTIC DEPENDENCE WITH CURRENT USE (HCC): ICD-10-CM

## 2024-12-20 ENCOUNTER — OFFICE VISIT (OUTPATIENT)
Dept: BEHAVIORAL HEALTH | Facility: PSYCHIATRIC FACILITY | Age: 74
End: 2024-12-20
Payer: MEDICARE

## 2024-12-20 DIAGNOSIS — F06.4 ANXIETY DISORDER DUE TO MEDICAL CONDITION: ICD-10-CM

## 2024-12-20 PROCEDURE — 90834 PSYTX W PT 45 MINUTES: CPT | Performed by: PSYCHOLOGIST

## 2024-12-20 NOTE — PROGRESS NOTES
Full therapy note has been documented and is under restricted viewing.  Please see below for summary of today's session.      Patient Name: Felicia Schaefer  Patient MRN: [id number]  Today's Date:  12/20/24     Type of session: individual psychotherapy  Session start time: 1  Session stop time: 1:45  Length of time spent face to face with patient: 45  Persons in attendance: patient     Diagnoses:   1. depression/caregiver stress         Symptoms currently being addressed in therapy: depression     Therapeutic Intervention(s): CBT. Support, problem solving     Treatment Goal(s)/Objective(s) addressed: Asking for help, being clear with her needs, setting boundaries.  Doing well, feeling empowered and valued.  Focused on being present to her  as his health declines.     Progress toward Treatment Goals: improved   Plan:      - Continue   therapy  in three weeks.  Oanh Carlisle, Ph.D.

## 2024-12-20 NOTE — PSYCHOTHERAPY
Feels her  is declining, focused on being present and loving to him.  Sad and realistic.    Stable    See in three weeks.

## 2024-12-23 RX ORDER — CLONAZEPAM 1 MG/1
TABLET ORAL
Qty: 30 TABLET | Refills: 0 | Status: SHIPPED | OUTPATIENT
Start: 2024-12-23 | End: 2025-01-02

## 2025-01-08 ENCOUNTER — OFFICE VISIT (OUTPATIENT)
Dept: MEDICAL GROUP | Facility: CLINIC | Age: 75
End: 2025-01-08
Payer: MEDICARE

## 2025-01-08 ENCOUNTER — HOSPITAL ENCOUNTER (OUTPATIENT)
Facility: MEDICAL CENTER | Age: 75
End: 2025-01-08
Attending: FAMILY MEDICINE
Payer: MEDICARE

## 2025-01-08 VITALS
DIASTOLIC BLOOD PRESSURE: 78 MMHG | HEIGHT: 64 IN | HEART RATE: 81 BPM | OXYGEN SATURATION: 97 % | SYSTOLIC BLOOD PRESSURE: 156 MMHG | BODY MASS INDEX: 24.75 KG/M2 | RESPIRATION RATE: 16 BRPM | WEIGHT: 145 LBS

## 2025-01-08 DIAGNOSIS — Z63.6 CAREGIVER STRESS: ICD-10-CM

## 2025-01-08 DIAGNOSIS — Z11.59 ENCOUNTER FOR HEPATITIS C SCREENING TEST FOR LOW RISK PATIENT: ICD-10-CM

## 2025-01-08 DIAGNOSIS — E03.9 HYPOTHYROIDISM, UNSPECIFIED TYPE: ICD-10-CM

## 2025-01-08 DIAGNOSIS — M89.28 OTHER DISORDERS OF BONE DEVELOPMENT AND GROWTH, OTHER SITE: ICD-10-CM

## 2025-01-08 DIAGNOSIS — E11.42 TYPE 2 DIABETES MELLITUS WITH DIABETIC POLYNEUROPATHY, WITHOUT LONG-TERM CURRENT USE OF INSULIN (HCC): ICD-10-CM

## 2025-01-08 DIAGNOSIS — M81.0 AGE-RELATED OSTEOPOROSIS WITHOUT CURRENT PATHOLOGICAL FRACTURE: ICD-10-CM

## 2025-01-08 DIAGNOSIS — G25.81 RESTLESS LEGS: ICD-10-CM

## 2025-01-08 DIAGNOSIS — Z12.12 SCREENING FOR COLORECTAL CANCER: ICD-10-CM

## 2025-01-08 DIAGNOSIS — R79.89 LOW VITAMIN D LEVEL: ICD-10-CM

## 2025-01-08 DIAGNOSIS — Z79.891 LONG TERM (CURRENT) USE OF OPIATE ANALGESIC: ICD-10-CM

## 2025-01-08 DIAGNOSIS — Z12.11 SCREENING FOR COLORECTAL CANCER: ICD-10-CM

## 2025-01-08 DIAGNOSIS — G25.81 RESTLESS LEG SYNDROME: ICD-10-CM

## 2025-01-08 DIAGNOSIS — F13.20 ANXIOLYTIC DEPENDENCE WITH CURRENT USE (HCC): ICD-10-CM

## 2025-01-08 DIAGNOSIS — E78.2 COMBINED HYPERLIPIDEMIA ASSOCIATED WITH TYPE 2 DIABETES MELLITUS (HCC): ICD-10-CM

## 2025-01-08 DIAGNOSIS — I10 ESSENTIAL HYPERTENSION, BENIGN: ICD-10-CM

## 2025-01-08 DIAGNOSIS — Z23 NEED FOR VACCINATION: ICD-10-CM

## 2025-01-08 DIAGNOSIS — Z12.31 ENCOUNTER FOR SCREENING MAMMOGRAM FOR BREAST CANCER: ICD-10-CM

## 2025-01-08 DIAGNOSIS — E11.69 COMBINED HYPERLIPIDEMIA ASSOCIATED WITH TYPE 2 DIABETES MELLITUS (HCC): ICD-10-CM

## 2025-01-08 DIAGNOSIS — E03.9 ACQUIRED HYPOTHYROIDISM: ICD-10-CM

## 2025-01-08 DIAGNOSIS — F33.2 ENDOGENOUS DEPRESSION (HCC): ICD-10-CM

## 2025-01-08 DIAGNOSIS — E11.42 DIABETIC POLYNEUROPATHY ASSOCIATED WITH TYPE 2 DIABETES MELLITUS (HCC): ICD-10-CM

## 2025-01-08 DIAGNOSIS — J45.20 MILD INTERMITTENT ASTHMA WITHOUT COMPLICATION: ICD-10-CM

## 2025-01-08 LAB
BASOPHILS # BLD AUTO: 0.4 % (ref 0–1.8)
BASOPHILS # BLD: 0.04 K/UL (ref 0–0.12)
EOSINOPHIL # BLD AUTO: 0.16 K/UL (ref 0–0.51)
EOSINOPHIL NFR BLD: 1.7 % (ref 0–6.9)
ERYTHROCYTE [DISTWIDTH] IN BLOOD BY AUTOMATED COUNT: 46.9 FL (ref 35.9–50)
HBA1C MFR BLD: 5.8 % (ref ?–5.8)
HCT VFR BLD AUTO: 41.7 % (ref 37–47)
HGB BLD-MCNC: 13.6 G/DL (ref 12–16)
IMM GRANULOCYTES # BLD AUTO: 0.02 K/UL (ref 0–0.11)
IMM GRANULOCYTES NFR BLD AUTO: 0.2 % (ref 0–0.9)
LYMPHOCYTES # BLD AUTO: 2.18 K/UL (ref 1–4.8)
LYMPHOCYTES NFR BLD: 23.7 % (ref 22–41)
MCH RBC QN AUTO: 31.1 PG (ref 27–33)
MCHC RBC AUTO-ENTMCNC: 32.6 G/DL (ref 32.2–35.5)
MCV RBC AUTO: 95.4 FL (ref 81.4–97.8)
MONOCYTES # BLD AUTO: 0.62 K/UL (ref 0–0.85)
MONOCYTES NFR BLD AUTO: 6.8 % (ref 0–13.4)
NEUTROPHILS # BLD AUTO: 6.16 K/UL (ref 1.82–7.42)
NEUTROPHILS NFR BLD: 67.2 % (ref 44–72)
NRBC # BLD AUTO: 0 K/UL
NRBC BLD-RTO: 0 /100 WBC (ref 0–0.2)
PLATELET # BLD AUTO: 282 K/UL (ref 164–446)
PMV BLD AUTO: 9.1 FL (ref 9–12.9)
POCT INT CON NEG: NEGATIVE
POCT INT CON POS: POSITIVE
RBC # BLD AUTO: 4.37 M/UL (ref 4.2–5.4)
WBC # BLD AUTO: 9.2 K/UL (ref 4.8–10.8)

## 2025-01-08 PROCEDURE — 36415 COLL VENOUS BLD VENIPUNCTURE: CPT

## 2025-01-08 PROCEDURE — G2211 COMPLEX E/M VISIT ADD ON: HCPCS | Performed by: FAMILY MEDICINE

## 2025-01-08 PROCEDURE — G0008 ADMIN INFLUENZA VIRUS VAC: HCPCS | Performed by: FAMILY MEDICINE

## 2025-01-08 PROCEDURE — 90662 IIV NO PRSV INCREASED AG IM: CPT | Performed by: FAMILY MEDICINE

## 2025-01-08 PROCEDURE — 3077F SYST BP >= 140 MM HG: CPT | Performed by: FAMILY MEDICINE

## 2025-01-08 PROCEDURE — 80061 LIPID PANEL: CPT

## 2025-01-08 PROCEDURE — 3078F DIAST BP <80 MM HG: CPT | Performed by: FAMILY MEDICINE

## 2025-01-08 PROCEDURE — 84443 ASSAY THYROID STIM HORMONE: CPT

## 2025-01-08 PROCEDURE — 82306 VITAMIN D 25 HYDROXY: CPT

## 2025-01-08 PROCEDURE — 80053 COMPREHEN METABOLIC PANEL: CPT

## 2025-01-08 PROCEDURE — 83036 HEMOGLOBIN GLYCOSYLATED A1C: CPT | Performed by: FAMILY MEDICINE

## 2025-01-08 PROCEDURE — 82570 ASSAY OF URINE CREATININE: CPT

## 2025-01-08 PROCEDURE — 85025 COMPLETE CBC W/AUTO DIFF WBC: CPT

## 2025-01-08 PROCEDURE — G0472 HEP C SCREEN HIGH RISK/OTHER: HCPCS

## 2025-01-08 PROCEDURE — 82043 UR ALBUMIN QUANTITATIVE: CPT

## 2025-01-08 PROCEDURE — 99215 OFFICE O/P EST HI 40 MIN: CPT | Mod: 25 | Performed by: FAMILY MEDICINE

## 2025-01-08 RX ORDER — CLONAZEPAM 1 MG/1
1 TABLET ORAL NIGHTLY
Qty: 30 TABLET | Refills: 5 | Status: SHIPPED | OUTPATIENT
Start: 2025-01-08 | End: 2025-07-07

## 2025-01-08 RX ORDER — MONTELUKAST SODIUM 10 MG/1
10 TABLET ORAL DAILY
Qty: 100 TABLET | Refills: 3 | Status: SHIPPED | OUTPATIENT
Start: 2025-01-08

## 2025-01-08 RX ORDER — LEVALBUTEROL TARTRATE 45 UG/1
2 AEROSOL, METERED ORAL EVERY 4 HOURS PRN
Qty: 15 G | Refills: 3 | Status: SHIPPED | OUTPATIENT
Start: 2025-01-08

## 2025-01-08 SDOH — SOCIAL STABILITY - SOCIAL INSECURITY: DEPENDENT RELATIVE NEEDING CARE AT HOME: Z63.6

## 2025-01-08 NOTE — PROGRESS NOTES
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 Continue with current defined treatment plan: nightly clonazepam for restless legs. No escalation of dose. Follow-up at least every 6 months.  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 Continue with current defined treatment plan: psychotherapy with Dr Carlisle. Follow-up at least annually.  Diagnosis: E11.69, E78.2 - Combined hyperlipidemia associated with type 2 diabetes mellitus (HCC)  Assessment and plan: Chronic, stable. Continue with current defined treatment plan: atorvastatin. Follow-up at least annually.  Diagnosis: E11.42 - Diabetic polyneuropathy associated with type 2 diabetes mellitus (HCC)  Assessment and plan: Chronic, stable. Continue with current defined treatment plan: Controlled with victoza. Follow-up at least annually.  Diagnosis: E11.42 - Type 2 diabetes mellitus with diabetic polyneuropathy, without long-term current use of insulin (HCC)  Assessment and plan: Chronic, stable. Continue with current defined treatment plan: Monofilament testing with a 10 gram force: sensation decreased bilaterally to toes.  Visual Inspection: Feet without maceration, ulcers, fissures.  Pedal pulses: decreased bilaterally  . Follow-up at least annually.  Last edited 01/09/25 09:31 PST by Shirley Keiht M.D.      CC:Diagnoses of Anxiolytic dependence with current use (HCC), Endogenous depression (HCC), Combined hyperlipidemia associated with type 2 diabetes mellitus (HCC), Diabetic polyneuropathy associated with type 2 diabetes mellitus (HCC), Type 2 diabetes mellitus with diabetic polyneuropathy, without long-term current use of insulin (HCC), Encounter for screening mammogram for breast cancer, Screening for colorectal cancer, Need for vaccination, Encounter for hepatitis C screening test for low risk patient, Other disorders of bone  development and growth, other site, Age-related osteoporosis without current pathological fracture, Hypothyroidism, unspecified type, Restless leg syndrome, Caregiver stress, Essential hypertension, benign, Long term (current) use of opiate analgesic, Restless legs, Low vitamin D level, and Mild intermittent asthma without complication were pertinent to this visit.      HISTORY OF PRESENT ILLNESS: Patient is a 74 y.o. female established patient who presents today to discuss the following chronic, stable problems:        Caregiver stress   is in the final stages of Alzheimer dementia. He spends a lot of time in bed. 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 Adriana is adament that he remain on the farm until he dies. She is aware this is costing her her health. She regularly engages in psychological support with Dr. Carlisle which she has found very helpful. I will see her  in my clinic next week for an evaluation and probable referral to palliative care/hospice. She is managing his agitation with occasional lorazepam which has been helpful.     Combined hyperlipidemia associated with type 2 diabetes mellitus (HCC)  Tolerating statin without difficulty. LDL 86    Diabetic polyneuropathy associated with type 2 diabetes mellitus (HCC)  Sensation diminished to toes. Polyneuropathy likely manifesting as restless legs. Has had thorough neurological evaluation by Dr. Rocha in May 2024 who attributes many of her symptoms to be most likely related to underlying anxiety/depression, CFS/fibromyalgia. Pt uses clonazepam at night to calm restless legs.     Anxiolytic dependence with current use ([facility])  Please see detailed note from Jan 2024.  Lengthy discuss about this medication with pt. She uses it for restless legs. She denies any suicidality, and has found the mental health support from Dr. Carlisle to be very helpful. She notes that she can go 2-3 days without a dose then has to take it again due to  restless legs.   No escalation of dose at this time, continue nightly as needed. 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   This is a topic we will continue to discuss at future visits. I suspect once her  passes away that the caregiver stress augmenting her symptoms will improve, and at that time we will revisit a slow taper.     Long term (current) use of opiate analgesic  Stable, managed by others.  reviewed and is appropriate.    Type 2 diabetes mellitus with neurologic complication (HCC)  Excellent control of DM2 with victoza. Pt has also purposefully lost 80lbs in the last 2 yrs. 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 Monofilament testing completed, and will refresh labs today including urine microalbumin.    Essential hypertension, benign  Office reading is high however pt notes that home readings are 130s/70s. Continue current management with ARB. Diabetes under good control.     Acquired hypothyroidism  Pt noted she was having hot flashes. Previous TSH showed mild overtreatent. She self-titrated to 25mcg dose and feels she is doing much better with this. Check TSH.    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    Risk for falls 03/29/2023    Caregiver stress 08/23/2022    Memory problem 08/23/2022    History of DVT (deep vein thrombosis) 02/08/2022    Type 2 diabetes mellitus with neurologic complication (HCC) 02/08/2022    Acquired hypothyroidism 10/18/2021    Anxiety disorder due to medical condition 10/18/2021    Anxiolytic dependence with current use (HCC) 10/18/2021    Combined hyperlipidemia associated with type 2 diabetes mellitus (HCC) 10/18/2021    Diabetic polyneuropathy associated with type 2 diabetes mellitus (HCC) 10/18/2021     Endogenous depression (HCC) 10/18/2021    Essential hypertension, benign 10/18/2021    Gastroesophageal reflux disease 10/18/2021    Long term (current) use of opiate analgesic 10/18/2021    Mixed hyperlipidemia 10/18/2021    Other chronic pain 10/18/2021    Primary localized osteoarthrosis of ankle and foot 12/18/2019        Allergies:Ondansetron, Penicillins, Tramadol, Apixaban, Diprivan, Lyrica, Metoclopramide, Paroxetine, Pregabalin, Rivaroxaban, and Warfarin    Current Outpatient Medications   Medication Sig Dispense Refill    levothyroxine (SYNTHROID) 25 MCG Tab Take 25 mcg by mouth every morning on an empty stomach. Indications: Underactive Thyroid      clonazePAM (KLONOPIN) 1 MG Tab Take 1 Tablet by mouth every evening for 180 days. Indications: Restless legs 30 Tablet 5    montelukast (SINGULAIR) 10 MG Tab Take 1 Tablet by mouth every day. 100 Tablet 3    levalbuterol (XOPENEX HFA) 45 MCG/ACT inhaler Inhale 2 Puffs every four hours as needed for Shortness of Breath. 15 g 3    liraglutide (VICTOZA) 18 MG/3ML Solution Pen-injector INJECT 1.8MG UNDER THE SKIN EVERY DAY 18 mL 3    losartan (COZAAR) 100 MG Tab TAKE 1 TABLET BY MOUTH EVERY  Tablet 3    Aug Betamethasone Dipropionate (DIPROLENE-AF) 0.05 % Cream APPLY TOPICALLY TO THE AFFECTED AREA TWICE DAILY 30 g 2    pravastatin (PRAVACHOL) 20 MG Tab TAKE 1 TABLET BY MOUTH EVERY EVENING 100 Tablet 3    glucose blood (CONTOUR NEXT TEST) strip 1 Strip by Other route in the morning, at noon, and at bedtime. 300 Strip 3    NOVOFINE PEN NEEDLE 32G X 6 MM Misc USE 1 PEN NEEDLE DAILY 100 Each 3    aspirin (ASA) 81 MG Chew Tab chewable tablet Chew 81 mg every day.      Lancets 30G Misc 1 Each in the morning, at noon, and at bedtime. 100 Each 11    ibuprofen (MOTRIN) 800 MG Tab Take 800 mg by mouth every 8 hours as needed.      oxyCODONE-acetaminophen (PERCOCET-10)  MG Tab Take  Tablets by mouth.       No current facility-administered medications for  "this visit.       Social History     Tobacco Use    Smoking status: Never    Smokeless tobacco: Never   Vaping Use    Vaping status: Former   Substance Use Topics    Alcohol use: Not Currently    Drug use: Not Currently     Social History     Social History Narrative    Not on file       History reviewed. No pertinent family history.    Exam:    BP (!) 156/78 (BP Location: Right arm, Patient Position: Sitting, BP Cuff Size: Adult)   Pulse 81   Resp 16   Ht 1.626 m (5' 4\")   Wt 65.8 kg (145 lb)   SpO2 97%  Body mass index is 24.89 kg/m².    General:  Well nourished, well developed female in NAD  HENT: Atraumatic, normocephalic  EYES: Extraocular movements intact, pupils equal and reactive to light  NECK: Supple, FROM  CHEST: No deformities, Equal chest expansion  RESP: Unlabored, no stridor or audible wheeze  ABD: Non-Distended  Extremities: No Clubbing, Cyanosis, or Edema  Skin: Warm/dry, without rashes  Neuro: A/O x 4, CN 2-12 Grossly intact, Motor/sensory grossly intact  Psych: Normal behavior, normal affect      LABS: Results reviewed and discussed with the patient, questions answered.        Return in about 6 months (around 7/8/2025).    My total time spent caring for the patient on the day of the encounter was 50 minutes.   This does not include time spent on separately billable procedures/tests.     "

## 2025-01-09 PROBLEM — E66.3 OVERWEIGHT WITH BODY MASS INDEX (BMI) OF 25 TO 25.9 IN ADULT: Status: RESOLVED | Noted: 2022-08-23 | Resolved: 2025-01-09

## 2025-01-09 PROBLEM — R07.89 OTHER CHEST PAIN: Status: RESOLVED | Noted: 2022-02-08 | Resolved: 2025-01-09

## 2025-01-09 PROBLEM — R00.2 PALPITATIONS: Status: RESOLVED | Noted: 2022-02-08 | Resolved: 2025-01-09

## 2025-01-09 LAB
25(OH)D3 SERPL-MCNC: 40 NG/ML (ref 30–100)
ALBUMIN SERPL BCP-MCNC: 4.4 G/DL (ref 3.2–4.9)
ALBUMIN/GLOB SERPL: 1.6 G/DL
ALP SERPL-CCNC: 71 U/L (ref 30–99)
ALT SERPL-CCNC: 13 U/L (ref 2–50)
ANION GAP SERPL CALC-SCNC: 11 MMOL/L (ref 7–16)
AST SERPL-CCNC: 29 U/L (ref 12–45)
BILIRUB SERPL-MCNC: 0.2 MG/DL (ref 0.1–1.5)
BUN SERPL-MCNC: 23 MG/DL (ref 8–22)
CALCIUM ALBUM COR SERPL-MCNC: 9.1 MG/DL (ref 8.5–10.5)
CALCIUM SERPL-MCNC: 9.4 MG/DL (ref 8.5–10.5)
CHLORIDE SERPL-SCNC: 102 MMOL/L (ref 96–112)
CHOLEST SERPL-MCNC: 162 MG/DL (ref 100–199)
CO2 SERPL-SCNC: 24 MMOL/L (ref 20–33)
CREAT SERPL-MCNC: 1.02 MG/DL (ref 0.5–1.4)
CREAT UR-MCNC: 96 MG/DL
GFR SERPLBLD CREATININE-BSD FMLA CKD-EPI: 58 ML/MIN/1.73 M 2
GLOBULIN SER CALC-MCNC: 2.7 G/DL (ref 1.9–3.5)
GLUCOSE SERPL-MCNC: 79 MG/DL (ref 65–99)
HCV AB SER QL: NONREACTIVE
HDLC SERPL-MCNC: 50 MG/DL
LDLC SERPL CALC-MCNC: 86 MG/DL
MICROALBUMIN UR-MCNC: <1.2 MG/DL
MICROALBUMIN/CREAT UR: NORMAL MG/G (ref 0–30)
POTASSIUM SERPL-SCNC: 4.9 MMOL/L (ref 3.6–5.5)
PROT SERPL-MCNC: 7.1 G/DL (ref 6–8.2)
SODIUM SERPL-SCNC: 137 MMOL/L (ref 135–145)
TRIGL SERPL-MCNC: 132 MG/DL (ref 0–149)
TSH SERPL-ACNC: 1.21 UIU/ML (ref 0.35–5.5)

## 2025-01-09 RX ORDER — LEVOTHYROXINE SODIUM 25 UG/1
25 TABLET ORAL
COMMUNITY

## 2025-01-09 NOTE — ASSESSMENT & PLAN NOTE
Office reading is high however pt notes that home readings are 130s/70s. Continue current management with ARB. Diabetes under good control.

## 2025-01-09 NOTE — ASSESSMENT & PLAN NOTE
Please see detailed note from Jan 2024.  Lengthy discuss about this medication with pt. She uses it for restless legs. She denies any suicidality, and has found the mental health support from Dr. Carlisle to be very helpful. She notes that she can go 2-3 days without a dose then has to take it again due to restless legs.   No escalation of dose at this time, continue nightly as needed. 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   This is a topic we will continue to discuss at future visits. I suspect once her  passes away that the caregiver stress augmenting her symptoms will improve, and at that time we will revisit a slow taper.

## 2025-01-09 NOTE — ASSESSMENT & PLAN NOTE
Excellent control of DM2 with victoza. Pt has also purposefully lost 80lbs in the last 2 yrs. 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 Monofilament testing completed, and will refresh labs today including urine microalbumin.

## 2025-01-09 NOTE — ASSESSMENT & PLAN NOTE
Sensation diminished to toes. Polyneuropathy likely manifesting as restless legs. Has had thorough neurological evaluation by Dr. Rocha in May 2024 who attributes many of her symptoms to be most likely related to underlying anxiety/depression, CFS/fibromyalgia. Pt uses clonazepam at night to calm restless legs.

## 2025-01-09 NOTE — ASSESSMENT & PLAN NOTE
Pt noted she was having hot flashes. Previous TSH showed mild overtreatent. She self-titrated to 25mcg dose and feels she is doing much better with this. Check TSH.

## 2025-01-10 ENCOUNTER — OFFICE VISIT (OUTPATIENT)
Dept: BEHAVIORAL HEALTH | Facility: PSYCHIATRIC FACILITY | Age: 75
End: 2025-01-10
Payer: MEDICARE

## 2025-01-10 DIAGNOSIS — F32.89 OTHER DEPRESSION: ICD-10-CM

## 2025-01-10 PROCEDURE — 90834 PSYTX W PT 45 MINUTES: CPT | Performed by: PSYCHOLOGIST

## 2025-01-10 NOTE — PSYCHOTHERAPY
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  Sad and optimistic.    Stable    See in two weeks.

## 2025-01-10 NOTE — PROGRESS NOTES
Full therapy note has been documented and is under restricted viewing.  Please see below for summary of today's session.      Patient Name: Felicia Schaefer  Patient MRN: [id number]  Today's Date:  1/10/25     Type of session: individual psychotherapy  Session start time: 1  Session stop time: 1:45  Length of time spent face to face with patient: 45  Persons in attendance: patient     Diagnoses:   1. depression/caregiver stress         Symptoms currently being addressed in therapy: depression     Therapeutic Intervention(s): CBT. Support, problem solving     Treatment Goal(s)/Objective(s) addressed: Asking for help, being clear with her needs, setting boundaries.  Doing well, feeling empowered and valued.  Focused on being present to her  as his health declines.     Progress toward Treatment Goals: improved   Plan:      - Continue   therapy  in two weeks.  Oanh Carlisle, Ph.D.

## 2025-01-24 ENCOUNTER — APPOINTMENT (OUTPATIENT)
Dept: BEHAVIORAL HEALTH | Facility: PSYCHIATRIC FACILITY | Age: 75
End: 2025-01-24
Payer: MEDICARE

## 2025-02-03 ENCOUNTER — PATIENT MESSAGE (OUTPATIENT)
Dept: MEDICAL GROUP | Facility: CLINIC | Age: 75
End: 2025-02-03
Payer: MEDICARE

## 2025-02-03 DIAGNOSIS — G25.81 RESTLESS LEG SYNDROME: ICD-10-CM

## 2025-02-07 RX ORDER — CLONAZEPAM 1 MG/1
1 TABLET ORAL NIGHTLY
Qty: 30 TABLET | Refills: 5 | Status: SHIPPED | OUTPATIENT
Start: 2025-02-07 | End: 2025-08-06

## 2025-03-27 ENCOUNTER — OFFICE VISIT (OUTPATIENT)
Dept: BEHAVIORAL HEALTH | Facility: PSYCHIATRIC FACILITY | Age: 75
End: 2025-03-27
Payer: MEDICARE

## 2025-03-27 DIAGNOSIS — F43.21 GRIEF: ICD-10-CM

## 2025-03-27 PROCEDURE — 90834 PSYTX W PT 45 MINUTES: CPT | Performed by: PSYCHOLOGIST

## 2025-03-27 NOTE — PSYCHOTHERAPY
in January, came in today with grief, tearful.  Functioning, no SI.    Stable, sad    See in one week.

## 2025-03-27 NOTE — PROGRESS NOTES
Full therapy note has been documented and is under restricted viewing.  Please see below for summary of today's session.      Patient Name: Felicia Schaefer  Patient MRN: [id number]  Today's Date:  3/27/25     Type of session: individual psychotherapy  Session start time: 11  Session stop time: 11:45  Length of time spent face to face with patient: 45  Persons in attendance: patient     Diagnoses:   Grief     Symptoms currently being addressed in therapy:  Tearful, grieving, missing her .     Therapeutic Intervention(s): CBT. Support, problem solving     Treatment Goal(s)/Objective(s) addressed: Asking for help, being clear with her needs, setting boundaries.  Caring for herself with proper nutrition and social support.   Progress toward Treatment Goals: improved   Plan:      - Continue   therapy  one week.  Oanh Carlisle, Ph.D.

## 2025-03-28 ENCOUNTER — TELEPHONE (OUTPATIENT)
Dept: HEALTH INFORMATION MANAGEMENT | Facility: OTHER | Age: 75
End: 2025-03-28
Payer: MEDICARE

## 2025-04-03 DIAGNOSIS — E03.9 ACQUIRED HYPOTHYROIDISM: ICD-10-CM

## 2025-04-03 RX ORDER — LEVOTHYROXINE SODIUM 25 UG/1
25 TABLET ORAL
Qty: 100 TABLET | Refills: 3 | Status: SHIPPED | OUTPATIENT
Start: 2025-04-03

## 2025-04-04 ENCOUNTER — OFFICE VISIT (OUTPATIENT)
Dept: BEHAVIORAL HEALTH | Facility: PSYCHIATRIC FACILITY | Age: 75
End: 2025-04-04
Payer: MEDICARE

## 2025-04-04 DIAGNOSIS — F43.21 GRIEF: ICD-10-CM

## 2025-04-04 DIAGNOSIS — F06.4 ANXIETY DISORDER DUE TO MEDICAL CONDITION: ICD-10-CM

## 2025-04-04 PROCEDURE — 90834 PSYTX W PT 45 MINUTES: CPT | Performed by: PSYCHOLOGIST

## 2025-04-04 NOTE — PROGRESS NOTES
Full therapy note has been documented and is under restricted viewing.  Please see below for summary of today's session.      Patient Name: Felicia Schaefer  Patient MRN: [id number]  Today's Date:  4/4/25     Type of session: individual psychotherapy  Session start time: 11  Session stop time: 11:45  Length of time spent face to face with patient: 45  Persons in attendance: patient     Diagnoses:   Grief     Symptoms currently being addressed in therapy:  Tearful, grieving, missing her .     Therapeutic Intervention(s): CBT. Support, problem solving     Treatment Goal(s)/Objective(s) addressed: Asking for help, being clear with her needs, setting boundaries.  Caring for herself with proper nutrition and social support.   Progress toward Treatment Goals: improved   Plan:      - Continue   therapy  one week.  Oanh Carlisle, Ph.D.

## 2025-04-10 ENCOUNTER — OFFICE VISIT (OUTPATIENT)
Dept: BEHAVIORAL HEALTH | Facility: PSYCHIATRIC FACILITY | Age: 75
End: 2025-04-10
Payer: MEDICARE

## 2025-04-10 DIAGNOSIS — F32.89 OTHER DEPRESSION: ICD-10-CM

## 2025-04-10 PROCEDURE — 90834 PSYTX W PT 45 MINUTES: CPT | Performed by: PSYCHOLOGIST

## 2025-04-10 NOTE — PROGRESS NOTES
Full therapy note has been documented and is under restricted viewing.  Please see below for summary of today's session.      Patient Name: Felicia Schaefer  Patient MRN: [id number]  Today's Date:  4/10/25     Type of session: individual psychotherapy  Session start time: 11  Session stop time: 11:45  Length of time spent face to face with patient: 45  Persons in attendance: patient     Diagnoses:   Grief, depression     Symptoms currently being addressed in therapy:  Tearful, grieving, missing her .     Therapeutic Intervention(s): CBT. Support, problem solving     Treatment Goal(s)/Objective(s) addressed: Asking for help, being clear with her needs, setting boundaries.  Caring for herself with proper nutrition and social support.   Progress toward Treatment Goals: improved   Plan:      - Continue   therapy  two weeks.  Oanh Carlisle, Ph.D.   Yes - the patient is able to be screened

## 2025-04-10 NOTE — PSYCHOTHERAPY
Coping and grieving her 's death.  Doing ok, taking care of herself effectively and reaching out to others.    Stable    See in two weeks.

## 2025-04-24 ENCOUNTER — OFFICE VISIT (OUTPATIENT)
Dept: BEHAVIORAL HEALTH | Facility: PSYCHIATRIC FACILITY | Age: 75
End: 2025-04-24
Payer: MEDICARE

## 2025-04-24 DIAGNOSIS — F43.21 GRIEF: ICD-10-CM

## 2025-04-24 PROCEDURE — 90834 PSYTX W PT 45 MINUTES: CPT | Performed by: PSYCHOLOGIST

## 2025-04-25 NOTE — PROGRESS NOTES
Full therapy note has been documented and is under restricted viewing.  Please see below for summary of today's session.      Patient Name: Felicia Schaefer  Patient MRN: [id number]  Today's Date:  4/24/25     Type of session: individual psychotherapy  Session start time: 11  Session stop time: 11:45  Length of time spent face to face with patient: 45  Persons in attendance: patient     Diagnoses:   Grief, depression     Symptoms currently being addressed in therapy:  Tearful, grieving, missing her .     Therapeutic Intervention(s): CBT. Support, problem solving     Treatment Goal(s)/Objective(s) addressed: Asking for help, being clear with her needs, setting boundaries.  Caring for herself with proper nutrition and social support.   Progress toward Treatment Goals: improved   Plan:      - Continue   therapy  one week.  Oanh Carlisle, Ph.D.

## 2025-04-25 NOTE — PSYCHOTHERAPY
Grieving, sad and overworked at the ranch.  Discussed asking for help and giving herself time to grieve.    Stable    See in one week.

## 2025-05-01 ENCOUNTER — OFFICE VISIT (OUTPATIENT)
Dept: BEHAVIORAL HEALTH | Facility: PSYCHIATRIC FACILITY | Age: 75
End: 2025-05-01
Payer: MEDICARE

## 2025-05-01 DIAGNOSIS — F43.21 GRIEF: ICD-10-CM

## 2025-05-01 PROCEDURE — 90834 PSYTX W PT 45 MINUTES: CPT | Performed by: PSYCHOLOGIST

## 2025-05-01 NOTE — PROGRESS NOTES
Patient Name: Felicia Schaefer  Patient MRN: [id number]  Today's Date:  5/1/25     Type of session: individual psychotherapy  Session start time: 11  Session stop time: 11:45  Length of time spent face to face with patient: 45  Persons in attendance: patient     Diagnoses:   Grief, depression     Symptoms currently being addressed in therapy:  Tearful, grieving, missing her .     Therapeutic Intervention(s): CBT. Support, problem solving     Treatment Goal(s)/Objective(s) addressed: Asking for help, being clear with her needs, setting boundaries.  Caring for herself with proper nutrition and social support.   Progress toward Treatment Goals: improved.    Full therapy note has been documented and is under restricted viewing.  Please see below for summary of today's session.      Patient Name: Felicia Schaefer  Patient MRN: [id number]  Today's Date:  4/24/25     Type of session: individual psychotherapy  Session start time: 11  Session stop time: 11:45  Length of time spent face to face with patient: 45  Persons in attendance: patient     Diagnoses:   Grief, depression     Symptoms currently being addressed in therapy:  Tearful, grieving, missing her .     Therapeutic Intervention(s): CBT. Support, problem solving     Treatment Goal(s)/Objective(s) addressed: Asking for help, being clear with her needs, setting boundaries.  Caring for herself with proper nutrition and social support.   Progress toward Treatment Goals: improved   Plan:      - Continue   therapy  one week.  Oanh Carlisle, Ph.D.

## 2025-05-01 NOTE — PSYCHOTHERAPY
Feeling more hopeful, more engaged with life.  Feels she is more open to her suffering, and that she is able to connect with life more meaningfully.    Stable    See in one week.

## 2025-05-08 ENCOUNTER — APPOINTMENT (OUTPATIENT)
Dept: BEHAVIORAL HEALTH | Facility: PSYCHIATRIC FACILITY | Age: 75
End: 2025-05-08
Payer: MEDICARE

## 2025-05-16 ENCOUNTER — OFFICE VISIT (OUTPATIENT)
Dept: BEHAVIORAL HEALTH | Facility: PSYCHIATRIC FACILITY | Age: 75
End: 2025-05-16
Payer: MEDICARE

## 2025-05-16 DIAGNOSIS — F43.21 GRIEF: Primary | ICD-10-CM

## 2025-05-16 PROCEDURE — 90834 PSYTX W PT 45 MINUTES: CPT | Performed by: PSYCHOLOGIST

## 2025-05-16 NOTE — PROGRESS NOTES
Patient Name: Felicia Schaefer  Patient MRN: [id number]  Today's Date:  5/16/25     Type of session: individual psychotherapy  Session start time: 11  Session stop time: 11:45  Length of time spent face to face with patient: 45  Persons in attendance: patient     Diagnoses:   Grief, depression     Symptoms currently being addressed in therapy:  behavioral activation, sharing grief in her family.     Therapeutic Intervention(s): CBT. Support, problem solving     Treatment Goal(s)/Objective(s) addressed: Asking for help, being clear with her needs, setting boundaries.  Caring for herself with proper nutrition and social support.   Progress toward Treatment Goals: improved   Plan:      - Continue   therapy  two weeks.    Oanh Carlisle, Ph.D.

## 2025-05-16 NOTE — PSYCHOTHERAPY
Doing well coping with grief, really connecting with 's daughter around their shared loss.    Stable    See in two weeks.

## 2025-05-27 DIAGNOSIS — E78.2 MIXED HYPERLIPIDEMIA: ICD-10-CM

## 2025-05-27 DIAGNOSIS — E11.42 TYPE 2 DIABETES MELLITUS WITH DIABETIC POLYNEUROPATHY, WITHOUT LONG-TERM CURRENT USE OF INSULIN (HCC): ICD-10-CM

## 2025-05-27 NOTE — TELEPHONE ENCOUNTER
Received request via: Pharmacy    Was the patient seen in the last year in this department? Yes    Does the patient have an active prescription (recently filled or refills available) for medication(s) requested? No    Pharmacy Name: [id number] - [place], [place] - [address] AT [facility]     Does the patient have custodial Plus and need 100-day supply? (This applies to ALL medications) Yes, quantity updated to 100 days

## 2025-05-28 RX ORDER — PRAVASTATIN SODIUM 20 MG
20 TABLET ORAL NIGHTLY
Qty: 100 TABLET | Refills: 0 | Status: SHIPPED | OUTPATIENT
Start: 2025-05-28

## 2025-05-29 ENCOUNTER — OFFICE VISIT (OUTPATIENT)
Dept: BEHAVIORAL HEALTH | Facility: PSYCHIATRIC FACILITY | Age: 75
End: 2025-05-29
Payer: MEDICARE

## 2025-05-29 DIAGNOSIS — F32.89 OTHER DEPRESSION: Primary | ICD-10-CM

## 2025-05-29 NOTE — PSYCHOTHERAPY
Working with grief, thoughtful about the ebb and flow of her feelings.    Stable  See in two weeks.  
[phone]

## 2025-05-29 NOTE — PROGRESS NOTES
Patient Name: Felicia Schaefer  Patient MRN: [id number]  Today's Date:  5/16/25     Type of session: individual psychotherapy  Session start time: 11  Session stop time: 11:45  Length of time spent face to face with patient: 45  Persons in attendance: patient     Diagnoses:   Grief, depression     Symptoms currently being addressed in therapy:  behavioral activation, sharing grief in her family.     Therapeutic Intervention(s): CBT. Support, problem solving     Treatment Goal(s)/Objective(s) addressed: Asking for help, being clear with her needs, setting boundaries.  Caring for herself with proper nutrition and social support.   Progress toward Treatment Goals: improved   Plan:      - Continue   therapy  two weeks.     Oanh Carlisle, Ph.D.

## 2025-06-12 ENCOUNTER — OFFICE VISIT (OUTPATIENT)
Dept: BEHAVIORAL HEALTH | Facility: PSYCHIATRIC FACILITY | Age: 75
End: 2025-06-12
Payer: MEDICARE

## 2025-06-12 DIAGNOSIS — F32.89 OTHER DEPRESSION: Primary | ICD-10-CM

## 2025-06-12 PROCEDURE — 90834 PSYTX W PT 45 MINUTES: CPT | Performed by: PSYCHOLOGIST

## 2025-06-12 NOTE — PSYCHOTHERAPY
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  Feeling stable.    See in two weeks.

## 2025-06-12 NOTE — PROGRESS NOTES
Patient Name: Felicia Schaefer  Patient MRN: [id number]  Today's Date:  6/12/25     Type of session: individual psychotherapy  Session start time: 11  Session stop time: 11:45  Length of time spent face to face with patient: 45  Persons in attendance: patient     Diagnoses:   Grief, depression     Symptoms currently being addressed in therapy:  behavioral activation, sharing grief in her family.     Therapeutic Intervention(s): CBT. Support, problem solving     Treatment Goal(s)/Objective(s) addressed: Asking for help, being clear with her needs, setting boundaries.  Caring for herself with proper nutrition and social support.   Progress toward Treatment Goals: improved   Plan:      - Continue   therapy  two weeks.     Oanh Carlisle, Ph.D.

## 2025-06-26 ENCOUNTER — OFFICE VISIT (OUTPATIENT)
Dept: BEHAVIORAL HEALTH | Facility: PSYCHIATRIC FACILITY | Age: 75
End: 2025-06-26
Payer: MEDICARE

## 2025-06-26 DIAGNOSIS — F32.89 OTHER DEPRESSION: Primary | ICD-10-CM

## 2025-06-26 PROCEDURE — 90834 PSYTX W PT 45 MINUTES: CPT | Performed by: PSYCHOLOGIST

## 2025-06-26 NOTE — PROGRESS NOTES
Patient Name: Felicia Schaefer  Patient MRN: [id number]  Today's Date:  6/26/25     Type of session: individual psychotherapy  Session start time: 11  Session stop time: 11:45  Length of time spent face to face with patient: 45  Persons in attendance: patient     Diagnoses:   Grief, depression     Symptoms currently being addressed in therapy:  behavioral activation, sharing grief in her family.     Therapeutic Intervention(s): CBT. Support, problem solving     Treatment Goal(s)/Objective(s) addressed: Asking for help, being clear with her needs, setting boundaries.  Caring for herself with proper nutrition and social support.   Progress toward Treatment Goals: improved   Plan:      - Continue   therapy  two weeks.     Oanh Carlisle, Ph.D.

## 2025-07-10 DIAGNOSIS — E11.42 TYPE 2 DIABETES MELLITUS WITH DIABETIC POLYNEUROPATHY, WITHOUT LONG-TERM CURRENT USE OF INSULIN (HCC): ICD-10-CM

## 2025-07-10 RX ORDER — BETAMETHASONE DIPROPIONATE 0.5 MG/G
CREAM TOPICAL 2 TIMES DAILY
Qty: 30 G | Refills: 2 | Status: SHIPPED | OUTPATIENT
Start: 2025-07-10

## 2025-07-11 ENCOUNTER — OFFICE VISIT (OUTPATIENT)
Dept: BEHAVIORAL HEALTH | Facility: PSYCHIATRIC FACILITY | Age: 75
End: 2025-07-11
Payer: MEDICARE

## 2025-07-11 DIAGNOSIS — F32.89 OTHER DEPRESSION: Primary | ICD-10-CM

## 2025-07-11 PROCEDURE — 90834 PSYTX W PT 45 MINUTES: CPT | Performed by: PSYCHOLOGIST

## 2025-07-11 NOTE — PSYCHOTHERAPY
Exploring feelings of grief.  Concerned about sleep, not interested in CBTI.    Stable    See in two weeks.

## 2025-07-11 NOTE — PROGRESS NOTES
Patient Name: Felicia Schaefer  Patient MRN: [id number]  Today's Date:  7/11/25     Type of session: individual psychotherapy  Session start time: 10  Session stop time: 19:45  Length of time spent face to face with patient: 45  Persons in attendance: patient     Diagnoses:   Grief, depression     Symptoms currently being addressed in therapy:  behavioral activation, sharing grief in her family.  Self care around fatigue, sleep, driving     Therapeutic Intervention(s): CBT. Support, problem solving     Treatment Goal(s)/Objective(s) addressed: Asking for help, being clear with her needs, setting boundaries.  Caring for herself with proper nutrition and social support.   Progress toward Treatment Goals: improved.  Emphasized the importance of self care around sleep, not interested in trying CBTI again.  Plan:      - Continue   therapy  two weeks.     Oanh Carlisle, Ph.D

## 2025-07-18 DIAGNOSIS — E11.42 TYPE 2 DIABETES MELLITUS WITH DIABETIC POLYNEUROPATHY, WITHOUT LONG-TERM CURRENT USE OF INSULIN (HCC): ICD-10-CM

## 2025-07-19 NOTE — TELEPHONE ENCOUNTER
Received request via: Pharmacy    Was the patient seen in the last year in this department? Yes    Does the patient have an active prescription (recently filled or refills available) for medication(s) requested? No    Pharmacy Name: [id number] - [place], [place] - [address] AT [facility]      Does the patient have custodial Plus and need 100-day supply? (This applies to ALL medications) Yes, quantity updated to 100 days

## 2025-07-21 RX ORDER — LIRAGLUTIDE 6 MG/ML
INJECTION SUBCUTANEOUS
Qty: 18 ML | Refills: 3 | Status: SHIPPED | OUTPATIENT
Start: 2025-07-21

## 2025-07-24 ENCOUNTER — OFFICE VISIT (OUTPATIENT)
Dept: BEHAVIORAL HEALTH | Facility: PSYCHIATRIC FACILITY | Age: 75
End: 2025-07-24
Payer: MEDICARE

## 2025-07-24 DIAGNOSIS — F32.89 OTHER DEPRESSION: Primary | ICD-10-CM

## 2025-07-24 PROCEDURE — 90834 PSYTX W PT 45 MINUTES: CPT | Performed by: PSYCHOLOGIST

## 2025-07-24 NOTE — PROGRESS NOTES
Patient Name: Felicia Schaefer  Patient MRN: [id number]  Today's Date:  7/24/25     Type of session: individual psychotherapy  Session start time: 11  Session stop time: 11:45  Length of time spent face to face with patient: 45  Persons in attendance: patient     Diagnoses:   Grief, depression     Symptoms currently being addressed in therapy:  behavioral activation, sharing grief in her family.      Therapeutic Intervention(s): CBT. Support, problem solving     Treatment Goal(s)/Objective(s) addressed: Asking for help, being clear with her needs, setting boundaries.  Caring for herself with proper nutrition and social support.   Progress toward Treatment Goals: improved.  Plan:      - Continue   therapy  two weeks.     Oanh Carlisle, Ph.D

## 2025-07-24 NOTE — PSYCHOTHERAPY
Doing well, focused on caring for herself and exploring what is next for her in terms of friendships.    Stable    See in two weeks.

## 2025-08-07 ENCOUNTER — OFFICE VISIT (OUTPATIENT)
Dept: BEHAVIORAL HEALTH | Facility: PSYCHIATRIC FACILITY | Age: 75
End: 2025-08-07
Payer: MEDICARE

## 2025-08-07 DIAGNOSIS — F32.89 OTHER DEPRESSION: Primary | ICD-10-CM

## 2025-08-07 PROCEDURE — 90834 PSYTX W PT 45 MINUTES: CPT | Performed by: PSYCHOLOGIST

## 2025-08-22 DIAGNOSIS — G25.81 RESTLESS LEG SYNDROME: ICD-10-CM

## 2025-08-25 RX ORDER — CLONAZEPAM 1 MG/1
TABLET ORAL
Qty: 30 TABLET | Refills: 5 | Status: SHIPPED | OUTPATIENT
Start: 2025-08-25 | End: 2026-02-21

## 2025-08-28 ENCOUNTER — OFFICE VISIT (OUTPATIENT)
Dept: BEHAVIORAL HEALTH | Facility: PSYCHIATRIC FACILITY | Age: 75
End: 2025-08-28
Payer: MEDICARE

## 2025-08-28 DIAGNOSIS — F32.89 OTHER DEPRESSION: Primary | ICD-10-CM
